# Patient Record
Sex: FEMALE | Race: WHITE | NOT HISPANIC OR LATINO | Employment: FULL TIME | URBAN - METROPOLITAN AREA
[De-identification: names, ages, dates, MRNs, and addresses within clinical notes are randomized per-mention and may not be internally consistent; named-entity substitution may affect disease eponyms.]

---

## 2019-01-28 ENCOUNTER — OFFICE VISIT CONVERTED (OUTPATIENT)
Dept: SURGERY | Facility: CLINIC | Age: 49
End: 2019-01-28
Attending: SURGERY

## 2019-02-04 ENCOUNTER — HOSPITAL ENCOUNTER (OUTPATIENT)
Dept: SLEEP MEDICINE | Facility: HOSPITAL | Age: 49
Discharge: HOME | End: 2019-02-04
Attending: INTERNAL MEDICINE

## 2019-02-11 ENCOUNTER — HOSPITAL ENCOUNTER (OUTPATIENT)
Dept: GASTROENTEROLOGY | Facility: HOSPITAL | Age: 49
Setting detail: HOSPITAL OUTPATIENT SURGERY
Discharge: HOME | End: 2019-02-11
Attending: SURGERY

## 2019-02-13 ENCOUNTER — HOSPITAL ENCOUNTER (OUTPATIENT)
Dept: SLEEP MEDICINE | Facility: HOSPITAL | Age: 49
Discharge: HOME | End: 2019-02-13
Attending: INTERNAL MEDICINE

## 2019-02-19 ENCOUNTER — HOSPITAL ENCOUNTER (OUTPATIENT)
Dept: GENERAL RADIOLOGY | Facility: HOSPITAL | Age: 49
Discharge: HOME | End: 2019-02-19

## 2019-02-25 ENCOUNTER — OFFICE VISIT CONVERTED (OUTPATIENT)
Dept: SURGERY | Facility: CLINIC | Age: 49
End: 2019-02-25
Attending: SURGERY

## 2019-02-25 ENCOUNTER — CONVERSION ENCOUNTER (OUTPATIENT)
Dept: SURGERY | Facility: CLINIC | Age: 49
End: 2019-02-25

## 2019-03-15 ENCOUNTER — HOSPITAL ENCOUNTER (OUTPATIENT)
Dept: SLEEP MEDICINE | Facility: HOSPITAL | Age: 49
Discharge: HOME | End: 2019-03-15
Attending: INTERNAL MEDICINE

## 2019-04-29 ENCOUNTER — HOSPITAL ENCOUNTER (OUTPATIENT)
Dept: SLEEP MEDICINE | Facility: HOSPITAL | Age: 49
Discharge: HOME | End: 2019-04-29
Attending: INTERNAL MEDICINE

## 2019-07-29 ENCOUNTER — HOSPITAL ENCOUNTER (OUTPATIENT)
Dept: SLEEP MEDICINE | Facility: HOSPITAL | Age: 49
Discharge: HOME | End: 2019-07-29
Attending: INTERNAL MEDICINE

## 2019-07-29 ENCOUNTER — OUTSIDE FACILITY SERVICE (OUTPATIENT)
Dept: SLEEP MEDICINE | Facility: HOSPITAL | Age: 49
End: 2019-07-29

## 2019-07-29 PROCEDURE — 99204 OFFICE O/P NEW MOD 45 MIN: CPT | Performed by: INTERNAL MEDICINE

## 2019-12-06 ENCOUNTER — OFFICE VISIT CONVERTED (OUTPATIENT)
Dept: ORTHOPEDIC SURGERY | Facility: CLINIC | Age: 49
End: 2019-12-06
Attending: ORTHOPAEDIC SURGERY

## 2020-01-10 ENCOUNTER — OFFICE VISIT CONVERTED (OUTPATIENT)
Dept: FAMILY MEDICINE CLINIC | Facility: CLINIC | Age: 50
End: 2020-01-10
Attending: NURSE PRACTITIONER

## 2020-01-16 ENCOUNTER — HOSPITAL ENCOUNTER (OUTPATIENT)
Dept: GENERAL RADIOLOGY | Facility: HOSPITAL | Age: 50
Discharge: HOME | End: 2020-01-16
Attending: NURSE PRACTITIONER

## 2020-01-24 ENCOUNTER — HOSPITAL ENCOUNTER (OUTPATIENT)
Dept: PERIOP | Facility: HOSPITAL | Age: 50
Setting detail: HOSPITAL OUTPATIENT SURGERY
Discharge: HOME | End: 2020-01-24
Attending: UROLOGY

## 2020-01-24 ENCOUNTER — OFFICE VISIT CONVERTED (OUTPATIENT)
Dept: UROLOGY | Facility: CLINIC | Age: 50
End: 2020-01-24
Attending: UROLOGY

## 2020-02-10 LAB — CONV PHOTO (CALCULI): NORMAL

## 2020-02-11 LAB
COLOR STONE: NORMAL
COMPN STONE: NORMAL
CONV CA OXALATE MONOHYDRATE: 100 %
CONV CALCULI COMMENT: NORMAL
CONV CALCULI DISCLAIMER: NORMAL
CONV CALCULI NOTE: NORMAL
SIZE STONE: NORMAL MM
WT STONE: 39 MG

## 2020-02-26 ENCOUNTER — HOSPITAL ENCOUNTER (OUTPATIENT)
Dept: GENERAL RADIOLOGY | Facility: HOSPITAL | Age: 50
Discharge: HOME | End: 2020-02-26
Attending: UROLOGY

## 2020-03-07 ENCOUNTER — HOSPITAL ENCOUNTER (OUTPATIENT)
Dept: OTHER | Facility: HOSPITAL | Age: 50
Discharge: HOME | End: 2020-03-07
Attending: NURSE PRACTITIONER

## 2020-03-07 LAB
ALBUMIN SERPL-MCNC: 4.2 G/DL (ref 3.5–5)
ALBUMIN/GLOB SERPL: 1.6 {RATIO} (ref 1.4–2.6)
ALP SERPL-CCNC: 58 U/L (ref 42–98)
ALT SERPL-CCNC: 26 U/L (ref 10–40)
ANION GAP SERPL CALC-SCNC: 16 MMOL/L (ref 8–19)
AST SERPL-CCNC: 24 U/L (ref 15–50)
BASOPHILS # BLD AUTO: 0.05 10*3/UL (ref 0–0.2)
BASOPHILS NFR BLD AUTO: 0.7 % (ref 0–3)
BILIRUB SERPL-MCNC: 0.52 MG/DL (ref 0.2–1.3)
BUN SERPL-MCNC: 12 MG/DL (ref 5–25)
BUN/CREAT SERPL: 17 {RATIO} (ref 6–20)
CALCIUM SERPL-MCNC: 9.1 MG/DL (ref 8.7–10.4)
CHLORIDE SERPL-SCNC: 104 MMOL/L (ref 99–111)
CHOLEST SERPL-MCNC: 250 MG/DL (ref 107–200)
CHOLEST/HDLC SERPL: 6.9 {RATIO} (ref 3–6)
CONV ABS IMM GRAN: 0.05 10*3/UL (ref 0–0.2)
CONV CO2: 25 MMOL/L (ref 22–32)
CONV IMMATURE GRAN: 0.7 % (ref 0–1.8)
CONV TOTAL PROTEIN: 6.9 G/DL (ref 6.3–8.2)
CREAT UR-MCNC: 0.7 MG/DL (ref 0.5–0.9)
DEPRECATED RDW RBC AUTO: 41.1 FL (ref 36.4–46.3)
EOSINOPHIL # BLD AUTO: 0.3 10*3/UL (ref 0–0.7)
EOSINOPHIL # BLD AUTO: 3.9 % (ref 0–7)
ERYTHROCYTE [DISTWIDTH] IN BLOOD BY AUTOMATED COUNT: 12.6 % (ref 11.7–14.4)
GFR SERPLBLD BASED ON 1.73 SQ M-ARVRAT: >60 ML/MIN/{1.73_M2}
GLOBULIN UR ELPH-MCNC: 2.7 G/DL (ref 2–3.5)
GLUCOSE SERPL-MCNC: 101 MG/DL (ref 65–99)
HCT VFR BLD AUTO: 42.4 % (ref 37–47)
HDLC SERPL-MCNC: 36 MG/DL (ref 40–60)
HGB BLD-MCNC: 14.5 G/DL (ref 12–16)
LDLC SERPL CALC-MCNC: 158 MG/DL (ref 70–100)
LYMPHOCYTES # BLD AUTO: 2.01 10*3/UL (ref 1–5)
LYMPHOCYTES NFR BLD AUTO: 26.2 % (ref 20–45)
MCH RBC QN AUTO: 30.7 PG (ref 27–31)
MCHC RBC AUTO-ENTMCNC: 34.2 G/DL (ref 33–37)
MCV RBC AUTO: 89.6 FL (ref 81–99)
MONOCYTES # BLD AUTO: 0.63 10*3/UL (ref 0.2–1.2)
MONOCYTES NFR BLD AUTO: 8.2 % (ref 3–10)
NEUTROPHILS # BLD AUTO: 4.62 10*3/UL (ref 2–8)
NEUTROPHILS NFR BLD AUTO: 60.3 % (ref 30–85)
NRBC CBCN: 0 % (ref 0–0.7)
OSMOLALITY SERPL CALC.SUM OF ELEC: 292 MOSM/KG (ref 273–304)
PLATELET # BLD AUTO: 219 10*3/UL (ref 130–400)
PMV BLD AUTO: 10.8 FL (ref 9.4–12.3)
POTASSIUM SERPL-SCNC: 4.2 MMOL/L (ref 3.5–5.3)
RBC # BLD AUTO: 4.73 10*6/UL (ref 4.2–5.4)
SODIUM SERPL-SCNC: 141 MMOL/L (ref 135–147)
TRIGL SERPL-MCNC: 438 MG/DL (ref 40–150)
TSH SERPL-ACNC: 1.89 M[IU]/L (ref 0.27–4.2)
WBC # BLD AUTO: 7.66 10*3/UL (ref 4.8–10.8)

## 2020-03-10 ENCOUNTER — OFFICE VISIT CONVERTED (OUTPATIENT)
Dept: UROLOGY | Facility: CLINIC | Age: 50
End: 2020-03-10
Attending: UROLOGY

## 2020-03-23 ENCOUNTER — TELEMEDICINE CONVERTED (OUTPATIENT)
Dept: FAMILY MEDICINE CLINIC | Facility: CLINIC | Age: 50
End: 2020-03-23
Attending: NURSE PRACTITIONER

## 2020-06-05 ENCOUNTER — HOSPITAL ENCOUNTER (OUTPATIENT)
Dept: GENERAL RADIOLOGY | Facility: HOSPITAL | Age: 50
Discharge: HOME | End: 2020-06-05
Attending: NURSE PRACTITIONER

## 2020-06-10 ENCOUNTER — HOSPITAL ENCOUNTER (OUTPATIENT)
Dept: OTHER | Facility: HOSPITAL | Age: 50
Discharge: HOME | End: 2020-06-10
Attending: NURSE PRACTITIONER

## 2020-06-10 LAB
ALBUMIN SERPL-MCNC: 4.3 G/DL (ref 3.5–5)
ALBUMIN/GLOB SERPL: 1.6 {RATIO} (ref 1.4–2.6)
ALP SERPL-CCNC: 51 U/L (ref 42–98)
ALT SERPL-CCNC: 26 U/L (ref 10–40)
ANION GAP SERPL CALC-SCNC: 16 MMOL/L (ref 8–19)
AST SERPL-CCNC: 23 U/L (ref 15–50)
BILIRUB SERPL-MCNC: 0.49 MG/DL (ref 0.2–1.3)
BUN SERPL-MCNC: 12 MG/DL (ref 5–25)
BUN/CREAT SERPL: 16 {RATIO} (ref 6–20)
CALCIUM SERPL-MCNC: 9.2 MG/DL (ref 8.7–10.4)
CHLORIDE SERPL-SCNC: 107 MMOL/L (ref 99–111)
CHOLEST SERPL-MCNC: 269 MG/DL (ref 107–200)
CHOLEST/HDLC SERPL: 7.9 {RATIO} (ref 3–6)
CONV CO2: 23 MMOL/L (ref 22–32)
CONV TOTAL PROTEIN: 7 G/DL (ref 6.3–8.2)
CREAT UR-MCNC: 0.77 MG/DL (ref 0.5–0.9)
GFR SERPLBLD BASED ON 1.73 SQ M-ARVRAT: >60 ML/MIN/{1.73_M2}
GLOBULIN UR ELPH-MCNC: 2.7 G/DL (ref 2–3.5)
GLUCOSE SERPL-MCNC: 106 MG/DL (ref 65–99)
HDLC SERPL-MCNC: 34 MG/DL (ref 40–60)
LDLC SERPL CALC-MCNC: 172 MG/DL (ref 70–100)
OSMOLALITY SERPL CALC.SUM OF ELEC: 294 MOSM/KG (ref 273–304)
POTASSIUM SERPL-SCNC: 4 MMOL/L (ref 3.5–5.3)
SODIUM SERPL-SCNC: 142 MMOL/L (ref 135–147)
TRIGL SERPL-MCNC: 409 MG/DL (ref 40–150)

## 2020-06-24 ENCOUNTER — OFFICE VISIT CONVERTED (OUTPATIENT)
Dept: FAMILY MEDICINE CLINIC | Facility: CLINIC | Age: 50
End: 2020-06-24
Attending: NURSE PRACTITIONER

## 2020-06-24 ENCOUNTER — CONVERSION ENCOUNTER (OUTPATIENT)
Dept: FAMILY MEDICINE CLINIC | Facility: CLINIC | Age: 50
End: 2020-06-24

## 2020-07-13 ENCOUNTER — OUTSIDE FACILITY SERVICE (OUTPATIENT)
Dept: SLEEP MEDICINE | Facility: HOSPITAL | Age: 50
End: 2020-07-13

## 2020-07-13 ENCOUNTER — HOSPITAL ENCOUNTER (OUTPATIENT)
Dept: SLEEP MEDICINE | Facility: HOSPITAL | Age: 50
Discharge: HOME | End: 2020-07-13
Attending: INTERNAL MEDICINE

## 2020-07-13 PROCEDURE — 99213 OFFICE O/P EST LOW 20 MIN: CPT | Performed by: INTERNAL MEDICINE

## 2020-09-16 ENCOUNTER — HOSPITAL ENCOUNTER (OUTPATIENT)
Dept: LAB | Facility: HOSPITAL | Age: 50
Discharge: HOME | End: 2020-09-16
Attending: NURSE PRACTITIONER

## 2020-09-16 LAB
ALBUMIN SERPL-MCNC: 4.3 G/DL (ref 3.5–5)
ALBUMIN/GLOB SERPL: 1.8 {RATIO} (ref 1.4–2.6)
ALP SERPL-CCNC: 58 U/L (ref 42–98)
ALT SERPL-CCNC: 18 U/L (ref 10–40)
ANION GAP SERPL CALC-SCNC: 16 MMOL/L (ref 8–19)
AST SERPL-CCNC: 20 U/L (ref 15–50)
BILIRUB SERPL-MCNC: 0.46 MG/DL (ref 0.2–1.3)
BUN SERPL-MCNC: 13 MG/DL (ref 5–25)
BUN/CREAT SERPL: 16 {RATIO} (ref 6–20)
CALCIUM SERPL-MCNC: 9.2 MG/DL (ref 8.7–10.4)
CHLORIDE SERPL-SCNC: 107 MMOL/L (ref 99–111)
CHOLEST SERPL-MCNC: 156 MG/DL (ref 107–200)
CHOLEST/HDLC SERPL: 4.2 {RATIO} (ref 3–6)
CONV CO2: 22 MMOL/L (ref 22–32)
CONV TOTAL PROTEIN: 6.7 G/DL (ref 6.3–8.2)
CREAT UR-MCNC: 0.81 MG/DL (ref 0.5–0.9)
GFR SERPLBLD BASED ON 1.73 SQ M-ARVRAT: >60 ML/MIN/{1.73_M2}
GLOBULIN UR ELPH-MCNC: 2.4 G/DL (ref 2–3.5)
GLUCOSE SERPL-MCNC: 107 MG/DL (ref 65–99)
HDLC SERPL-MCNC: 37 MG/DL (ref 40–60)
LDLC SERPL CALC-MCNC: 77 MG/DL (ref 70–100)
OSMOLALITY SERPL CALC.SUM OF ELEC: 293 MOSM/KG (ref 273–304)
POTASSIUM SERPL-SCNC: 4.1 MMOL/L (ref 3.5–5.3)
SODIUM SERPL-SCNC: 141 MMOL/L (ref 135–147)
TRIGL SERPL-MCNC: 210 MG/DL (ref 40–150)
VLDLC SERPL-MCNC: 42 MG/DL (ref 5–37)

## 2020-09-21 ENCOUNTER — OFFICE VISIT CONVERTED (OUTPATIENT)
Dept: FAMILY MEDICINE CLINIC | Facility: CLINIC | Age: 50
End: 2020-09-21
Attending: NURSE PRACTITIONER

## 2020-09-21 ENCOUNTER — CONVERSION ENCOUNTER (OUTPATIENT)
Dept: FAMILY MEDICINE CLINIC | Facility: CLINIC | Age: 50
End: 2020-09-21

## 2021-04-01 ENCOUNTER — OFFICE VISIT CONVERTED (OUTPATIENT)
Dept: FAMILY MEDICINE CLINIC | Facility: CLINIC | Age: 51
End: 2021-04-01
Attending: NURSE PRACTITIONER

## 2021-05-13 NOTE — PROGRESS NOTES
Progress Note      Patient Name: Loreto Schumacher   Patient ID: [id number]   Sex: Female   YOB: 1970    Primary Care Provider: Consuelo CARLSON   Referring Provider: Consuelo CARLSON    Visit Date: September 21, 2020    Provider: ROBYN Calvillo   Location: [facility]   Location Address: [address] [place] [address], [address]  [place], [place]  [id number]   Location Phone: [phone]          Chief Complaint  · Discuss HLD labwork      History Of Present Illness  Loreto Schumacher is a 50 year old /White female who presents for evaluation and treatment of:      Patient is here today to go over her most recent labwork.    HLD: Vascepa Crestor she is doing good on the medications and tolerating well. She is trying hard to work on her diet. She has given up drinking any type of sodas.     Patient is doing well with the medication. Patient states that she does have some muscle aches first thing in the morning but after that she is fine all day.       Past Medical History  Disease Name Date Onset Notes   Allergic rhinitis, chronic --  --    Bladder Disorder --  --    Hyperlipidemia --  --    Kidney stones --  --    Pap smear for cervical cancer screening 2013 will go to [facility]   Screening Mammogram 02/2019 --    Seasonal allergies --  --          Past Surgical History  Procedure Name Date Notes   Appendectomy 1980 --    Colonoscopy 02/2019 DIVERTICULOSIS, HEMORRHOIDS   Cyst Removal 2012 --    Hysterectomy --  --    Hysterectomy-Abdominal 2004 --          Medication List  Name Date Started Instructions   Crestor 10 mg oral tablet 06/16/2020 take 1 tablet (10 mg) by oral route once daily at bedtime   One-A-Day Womens Formula 18 mg iron-400 mcg-500 mg oral tablet  take 1 tablet by oral route daily   red yeast rice 600 mg oral tablet  take 1 tablet by oral route daily for 90 days   Vascepa 1 gram oral capsule 07/17/2020 take 2 capsules (2 gram) by oral route 2 times per day with food  swallowing whole. Do not chew, open, dissolve and/or crush. for 90 days   Vitamin D3 50 mcg (2,000 unit) oral capsule  take 1 capsule by oral route daily   vitamin E 100 unit oral capsule  take 1 capsule by oral route daily         Allergy List  Allergen Name Date Reaction Notes   NO KNOWN DRUG ALLERGIES --  --  --        Allergies Reconciled  Family Medical History  Disease Name Relative/Age Notes   Stroke Grandmother (maternal)/  Grandmother (paternal)/   --    Heart Disease Father/  Grandfather (maternal)/  Mother/   Mother   Hypertension Father/  Grandfather (maternal)/  Grandmother (maternal)/   --    Cancer, Unspecified Father/   Father   Diabetes, unspecified type Father/   Father   Renal Calculus Father/  Mother/   Mother; Father  Mother; Father; Brother   Family history of colon cancer Father/50s   Father/50s  Father/60s   Family history of breast cancer  Aunt/40s  Aunt/50s   Renal Cancer Brother/30s   Brother/30s         Social History  Finding Status Start/Stop Quantity Notes   Alcohol Never --/-- --  01/10/2020 -    Caffeine Never --/-- --  drinks no  drinks regularly; tea and soft drinks; 1-2 times per day   lives with spouse --  --/-- --  --    . --  --/-- --  --    Recreational Drug Use Never --/-- --  no   Second hand smoke exposure Never --/-- --  no  yes   Tobacco Never --/-- --  former smoker  never a smoker   Working --  --/-- --  --          Immunizations  NameDate Admin Mfg Trade Name Lot Number Route Inj VIS Given VIS Publication   InfluenzaRefused 01/10/2020 NE Not Entered  NE NE     Comments:          Review of Systems  · Constitutional  o Denies  o : fatigue  · Eyes  o Denies  o : blurred vision, changes in vision  · HENT  o Denies  o : headaches  · Cardiovascular  o Denies  o : chest pain, irregular heart beats, rapid heart rate, dyspnea on exertion  · Respiratory  o Denies  o : shortness of breath, wheezing, cough  · Gastrointestinal  o Denies  o : nausea, vomiting, diarrhea,  "constipation, abdominal pain, blood in stools, melena  · Genitourinary  o Denies  o : frequency, dysuria, hematuria  · Integument  o Denies  o : rash, new skin lesions  · Musculoskeletal  o Denies  o : joint pain, joint swelling, muscle pain  · Endocrine  o Denies  o : polyuria, polydipsia      Vitals  Date Time BP Position Site L\R Cuff Size HR RR TEMP (F) WT  HT  BMI kg/m2 BSA m2 O2 Sat HC       09/21/2020 08:35 /83 Sitting    77 - R  97.6 203lbs 6oz 5'  5\" 33.84 2.06 99 %          Physical Examination  · Constitutional  o Appearance  o : well developed, well-nourished, no acute distress  · Head and Face  o Head  o : normocephalic, atraumatic  · Neck  o Inspection/Palpation  o : normal appearance, no masses or tenderness, trachea midline  o Thyroid  o : gland size normal, nontender, no nodules or masses present on palpation  · Respiratory  o Respiratory Effort  o : breathing unlabored  o Inspection of Chest  o : chest rise symmetric bilaterally  o Auscultation of Lungs  o : clear to auscultation bilaterally throughout inspiration and expiration  · Cardiovascular  o Heart  o :   § Auscultation of Heart  § : regular rate and rhythm, no murmurs, gallops or rubs  o Peripheral Vascular System  o :   § Extremities  § : no edema  · Lymphatic  o Neck  o : no cervical lymphadenopathy, no supraclavicular lymphadenopathy  · Psychiatric  o Mood and Affect  o : mood normal, affect appropriate          Assessment  · Hyperlipidemia     272.4/E78.5  cont medications and diet control    Problems Reconciled  Plan  · Orders  o Physical, Primary Care Panel (CBC, CMP, Lipid, TSH) [facility] ([id number], 90922, [address], 83129) - 272.4/E78.5 - 03/07/2021  o ACO-39: Current medications updated and reviewed () - - 09/21/2020  · Medications  o Medications have been Reconciled  o Transition of Care or Provider Policy  · Instructions  o Patient was educated and given low cholesterol diet information.  o Recommended exercise program to assist " with cholesterol, weight loss and overall health improvement.  o Advised that cheeses and other sources of dairy fats, animal fats, fast food, and the extras (candy, pastries, pies, doughnuts and cookies) all contain LDL raising nutrients. Advised to increase fruits, vegetables, whole grains, and to monitor portion sizes.   o Patient is taking medications as prescribed and doing well.   o Patient was educated/instructed on their diagnosis, treatment and medications prior to discharge from the clinic today.  o Call the office with any concerns or questions.  o Electronically Identified Patient Education Materials Provided Electronically  · Disposition  o Follow Up in 6 months     no refills needed at OV today. will call when needs refills. will f/u in 6 months, lab form given for pt to have labs drawn prior to appt.             Electronically Signed by: ROBYN Calvillo -Author on September 21, 2020 09:31:24 AM

## 2021-05-13 NOTE — PROGRESS NOTES
Progress Note      Patient Name: Loreto Schumacher   Patient ID: [id number]   Sex: Female   YOB: 1970    Primary Care Provider: Consuelo CARLSON   Referring Provider: Consuelo CARLSON    Visit Date: June 24, 2020    Provider: ROBYN Calvillo   Location: [facility]   Location Address: [address] [place] [address], [address]  [place], [place]  [id number]   Location Phone: [phone]          Chief Complaint  · Pt here to f/u on lab work       History Of Present Illness  Loreto Schumacher is a 49 year old /White female who presents for evaluation and treatment of:      Hyeprlipidemia    PT here to f/u on labwork from previous OV. She was previously started on Vascepa in 3/20 due to elevated triglycerides and has been taking compliantly, however at the recent labwork on 6/10, there wasn't alot of improvement. As a result she was started on Crestor 10mg and she started that three nights ago. She states she is trying harder on her diet, but she has a strong family history of elevated triglycerides.       Past Medical History  Disease Name Date Onset Notes   Allergic rhinitis, chronic --  --    Bladder Disorder --  --    Hyperlipidemia --  --    Kidney stones --  --    Pap smear for cervical cancer screening 2013 will go to [facility]   Screening Mammogram 02/2019 --    Seasonal allergies --  --          Past Surgical History  Procedure Name Date Notes   Appendectomy 1980 --    Colonoscopy 02/2019 DIVERTICULOSIS, HEMORRHOIDS   Cyst Removal 2012 --    Hysterectomy --  --    Hysterectomy-Abdominal 2004 --          Medication List  Name Date Started Instructions   Crestor 10 mg oral tablet 06/16/2020 take 1 tablet (10 mg) by oral route once daily at bedtime   One-A-Day Womens Formula 18 mg iron-400 mcg-500 mg oral tablet  take 1 tablet by oral route daily   Vascepa 1 gram oral capsule 03/20/2020 take 2 capsules (2 gram) by oral route 2 times per day with food swallowing whole. Do not chew, open, dissolve and/or  crush.   Vitamin D3 50 mcg (2,000 unit) oral capsule  take 1 capsule by oral route daily         Allergy List  Allergen Name Date Reaction Notes   NO KNOWN DRUG ALLERGIES --  --  --        Allergies Reconciled  Family Medical History  Disease Name Relative/Age Notes   Stroke Grandmother (maternal)/  Grandmother (paternal)/   --    Heart Disease Father/  Grandfather (maternal)/  Mother/   Mother   Hypertension Father/  Grandfather (maternal)/  Grandmother (maternal)/   --    Cancer, Unspecified Father/   Father   Diabetes, unspecified type Father/   Father   Renal Calculus Father/  Mother/   Mother; Father  Mother; Father; Brother   Family history of colon cancer Father/50s   Father/50s  Father/60s   Family history of breast cancer  Aunt/40s  Aunt/50s   Renal Cancer Brother/30s   Brother/30s         Social History  Finding Status Start/Stop Quantity Notes   Alcohol Never --/-- --  01/10/2020 -    Caffeine Never --/-- --  drinks no  drinks regularly; tea and soft drinks; 1-2 times per day   lives with spouse --  --/-- --  --    . --  --/-- --  --    Recreational Drug Use Never --/-- --  no   Second hand smoke exposure Never --/-- --  no  yes   Tobacco Never --/-- --  former smoker  never a smoker   Working --  --/-- --  --          Immunizations  NameDate Admin Mfg Trade Name Lot Number Route Inj VIS Given VIS Publication   InfluenzaRefused 01/10/2020 NE Not Entered  NE NE     Comments:          Review of Systems  · Constitutional  o Denies  o : fever, fatigue, weight loss, weight gain  · Cardiovascular  o Denies  o : lower extremity edema, claudication, chest pressure, palpitations  · Respiratory  o Denies  o : shortness of breath, wheezing, cough, hemoptysis, dyspnea on exertion  · Gastrointestinal  o Denies  o : nausea, vomiting, diarrhea, constipation, abdominal pain      Vitals  Date Time BP Position Site L\R Cuff Size HR RR TEMP (F) WT  HT  BMI kg/m2 BSA m2 O2 Sat HC       06/24/2020 08:08 /75  "Sitting    67 - R  98.3 200lbs 6oz 5'  4\" 34.39 2.03 99 %          Physical Examination  · Constitutional  o Appearance  o : well developed, well-nourished, no acute distress  · Head and Face  o Head  o : normocephalic, atraumatic  · Neck  o Inspection/Palpation  o : normal appearance, no masses or tenderness, trachea midline  o Thyroid  o : gland size normal, nontender, no nodules or masses present on palpation  · Respiratory  o Respiratory Effort  o : breathing unlabored  o Inspection of Chest  o : chest rise symmetric bilaterally  o Auscultation of Lungs  o : clear to auscultation bilaterally throughout inspiration and expiration  · Cardiovascular  o Heart  o :   § Auscultation of Heart  § : regular rate and rhythm, no murmurs, gallops or rubs  o Peripheral Vascular System  o :   § Extremities  § : no edema  · Psychiatric  o Mood and Affect  o : mood normal, affect appropriate          Assessment  · Hyperlipidemia     272.4/E78.5  cont. Vascepa and Crestor. Side effects and administration addressed. will recheck 3 months    Problems Reconciled  Plan  · Orders  o ACO-39: Current medications updated and reviewed () - - 06/24/2020  · Medications  o Medications have been Reconciled  o Transition of Care or Provider Policy  · Instructions  o Patient was educated and given low cholesterol diet information.  o Recommended exercise program to assist with cholesterol, weight loss and overall health improvement.  o Advised that cheeses and other sources of dairy fats, animal fats, fast food, and the extras (candy, pastries, pies, doughnuts and cookies) all contain LDL raising nutrients. Advised to increase fruits, vegetables, whole grains, and to monitor portion sizes.   o Patient is taking medications as prescribed and doing well.   o Take all medications as prescribed/directed.  o Patient was educated/instructed on their diagnosis, treatment and medications prior to discharge from the clinic today.  o Patient was " instructed to exercise regularly.  o Call the office with any concerns or questions.  · Disposition  o Follow Up in 3 months            Electronically Signed by: ROBYN Calvillo -Author on June 24, 2020 10:05:45 AM

## 2021-05-14 VITALS
TEMPERATURE: 98.3 F | WEIGHT: 208.06 LBS | BODY MASS INDEX: 34.66 KG/M2 | DIASTOLIC BLOOD PRESSURE: 80 MMHG | OXYGEN SATURATION: 99 % | HEART RATE: 70 BPM | SYSTOLIC BLOOD PRESSURE: 129 MMHG | HEIGHT: 65 IN

## 2021-05-14 VITALS
TEMPERATURE: 97.6 F | HEART RATE: 77 BPM | SYSTOLIC BLOOD PRESSURE: 132 MMHG | DIASTOLIC BLOOD PRESSURE: 83 MMHG | BODY MASS INDEX: 33.88 KG/M2 | WEIGHT: 203.37 LBS | HEIGHT: 65 IN | OXYGEN SATURATION: 99 %

## 2021-05-14 NOTE — PROGRESS NOTES
Progress Note      Patient Name: Loreto Schumacher   Patient ID: [id number]   Sex: Female   YOB: 1970    Primary Care Provider: Consuelo CARLSON   Referring Provider: Consuelo CARLSON    Visit Date: April 1, 2021    Provider: ROBYN Peñaloza   Location: [facility]   Location Address: [address] [place] [address], [address]  [place], [place]  [id number]   Location Phone: [phone]          Chief Complaint  · Right ear pain  · Dizziness  · Nausea      History Of Present Illness  Loreto Schumacher is a 50 year old /White female who presents for evaluation and treatment of:      Patient complaining of right ear pain since last night.  Patient states she woke up in the middle of the night with intense pain.  Patient states everytime she swallowed her pain increased.  Symptoms have improved somewhat since waking up this morning.  Patient denies ear drainage, d/c, sinus congestion, fever, decreased hearing.  Patient admits to aching through her shoulders and up into her right ear.    Patient complaining of dizzy spells, causing nausea X 3-4 weeks.  Patient has hx of vertigo.  Patient has used Scopolamine patches in the past with good results. needs new RX       Past Medical History  Disease Name Date Onset Notes   Allergic rhinitis, chronic --  --    Bladder disorder --  --    Hyperlipidemia --  --    Kidney Stones --  --    Pap smear for cervical cancer screening 2013 will go to [facility]   Screening Mammogram 02/2019 --    Seasonal allergies --  --          Past Surgical History  Procedure Name Date Notes   Appendectomy 1980 --    Colonoscopy 02/2019 DIVERTICULOSIS, HEMORRHOIDS   Cyst Removal 2012 --    Hysterectomy --  --    Hysterectomy-Abdominal 2004 --          Medication List  Name Date Started Instructions   acetylcysteine 500 mg oral capsule  take 1 capsule by oral route daily   Crestor 10 mg oral tablet 12/23/2020 take 1 tablet (10 mg) by oral route once daily at bedtime for  30 days   hydroxychloroquine 200 mg oral tablet  take 1 tablet (200 mg) by oral route every Sunday   One-A-Day Womens Formula 18 mg iron-400 mcg-500 mg oral tablet  take 1 tablet by oral route daily   red yeast rice 600 mg oral tablet  take 1 tablet by oral route daily for 90 days   Vascepa 1 gram oral capsule 12/23/2020 take 2 capsules (2 gram) by oral route 2 times per day with food swallowing whole. Do not chew, open, dissolve and/or crush. for 90 days   Vitamin D3 125 mcg (5,000 unit) oral tablet  take 1 tablet by oral route daily   vitamin E 100 unit oral capsule  take 1 capsule by oral route daily   zinc 50 mg oral tablet  take 1 tablet by oral route daily         Allergy List  Allergen Name Date Reaction Notes   NO KNOWN DRUG ALLERGIES --  --  --        Allergies Reconciled  Family Medical History  Disease Name Relative/Age Notes   Stroke Grandmother (maternal)/  Grandmother (paternal)/   --    Heart Disease Father/  Grandfather (maternal)/  Mother/   Mother   Hypertension Father/  Grandfather (maternal)/  Grandmother (maternal)/   --    Cancer, Unspecified Father/   Father   Diabetes, unspecified type Father/   Father   Renal Calculus Father/  Mother/   Mother; Father  Mother; Father; Brother   Family history of colon cancer Father/50s   Father/50s  Father/60s   Family history of breast cancer  Aunt/40s  Aunt/50s   Renal Cancer Brother/30s   Brother/30s         Social History  Finding Status Start/Stop Quantity Notes   Alcohol Never --/-- --  01/10/2020 -    Caffeine Never --/-- --  drinks no  drinks regularly; tea and soft drinks; 1-2 times per day   lives with spouse --  --/-- --  --    . --  --/-- --  --    Recreational Drug Use Never --/-- --  no   Second hand smoke exposure Never --/-- --  no  yes   Tobacco Never --/-- --  --    Working --  --/-- --  --          Immunizations  NameDate Admin Mfg Trade Name Lot Number Route Inj VIS Given VIS Publication   InfluenzaRefused 04/01/2021 NE Not  "Entered  NE NE     Comments:          Review of Systems  · Constitutional  o Denies  o : fever, fatigue, weight loss, weight gain  · Cardiovascular  o Denies  o : lower extremity edema, claudication, chest pressure, palpitations  · Respiratory  o Denies  o : shortness of breath, wheezing, cough, hemoptysis, dyspnea on exertion  · Gastrointestinal  o Denies  o : nausea, vomiting, diarrhea, constipation, abdominal pain      Vitals  Date Time BP Position Site L\R Cuff Size HR RR TEMP (F) WT  HT  BMI kg/m2 BSA m2 O2 Sat FR L/min FiO2 HC       06/24/2020 08:08 /75 Sitting    67 - R  98.3 200lbs 6oz 5'  4\" 34.39 2.03 99 %      09/21/2020 08:35 /83 Sitting    77 - R  97.6 203lbs 6oz 5'  5\" 33.84 2.06 99 %  21%    04/01/2021 09:47 /80 Sitting    70 - R  98.3 208lbs 1oz 5'  5\" 34.62 2.08 99 %  21%          Physical Examination  · Constitutional  o Appearance  o : well developed, well-nourished, no acute distress  · Head and Face  o Head  o : normocephalic, atraumatic  · Ears, Nose, Mouth and Throat  o Ears  o :   § External Ears  § : external auditory canal appearance normal, no discharge present  § Otoscopic Examination  § : tympanic membranes pearly white/gray bilaterally, moderate amt fluid noted right TM, small amt left   o Nose  o :   § External Nose  § : no lesions noted  § Nasopharynx  § : no discharge present  o Oral Cavity  o :   § Oral Mucosa  § : oral mucosa light pink  o Throat  o :   § Oropharynx  § : tonsils without exudate, no palatal petechiae  · Neck  o Inspection/Palpation  o : normal appearance, no masses or tenderness, trachea midline  o Thyroid  o : gland size normal, nontender, no nodules or masses present on palpation  · Respiratory  o Respiratory Effort  o : breathing unlabored  o Inspection of Chest  o : chest rise symmetric bilaterally  o Auscultation of Lungs  o : clear to auscultation bilaterally throughout inspiration and expiration  · Cardiovascular  o Heart  o : "   § Auscultation of Heart  § : regular rate and rhythm, no murmurs, gallops or rubs  o Peripheral Vascular System  o :   § Extremities  § : no edema  · Lymphatic  o Neck  o : no cervical lymphadenopathy, no supraclavicular lymphadenopathy  · Psychiatric  o Mood and Affect  o : mood normal, affect appropriate          Assessment  · Nausea     787.02/R11.0  · Otitis, serous     381.4/H65.90  · Otalgia of right ear     388.70/H92.01  · Vertigo     780.4/R42      Plan  · Orders  o ACO-14: Influenza immunization was not administered for reasons documented [facility] () - - 2021   Patient refused  o ACO-39: Current medications updated and reviewed (, 0609F) - - 2021  · Medications  o scopolamine base 1 mg over 3 days transdermal patch 3 day   SIG: apply 1 patch by transdermal route to the hairless area behind 1 ear at least 4 hr before effect is required; reapply every 3 days as needed   DISP: (1) Box with 0 refills  Prescribed on 2021     o prednisone 20 mg oral tablet   SI tab PO BID for 5 days   DISP: (10) Tablet with 0 refills  Prescribed on 2021     o amoxicillin 875 mg oral tablet   SIG: take 1 tablet (875 mg) by oral route every 12 hours   DISP: (20) Tablet with 0 refills  Prescribed on 2021     o Medications have been Reconciled  o Transition of Care or Provider Policy  · Instructions  o Take all medications as prescribed/directed.  o Patient was educated/instructed on their diagnosis, treatment and medications prior to discharge from the clinic today.  o Patient instructed to seek medical attention urgently for new or worsening symptoms.  o Call the office with any concerns or questions.  o Chronic conditions reviewed and taken into consideration for today's treatment plan.  · Disposition  o Call or Return if symptoms worsen or persist.  o Follow Up PRN     because we will be out of office the next 3 days, pt given rx for antibiotic to place on hold- if pain worsening, fever,  etc, pt is to fill and take.             Electronically Signed by: ROBYN Peñaloza -Author on April 1, 2021 10:37:47 AM

## 2021-05-15 VITALS
DIASTOLIC BLOOD PRESSURE: 75 MMHG | OXYGEN SATURATION: 99 % | SYSTOLIC BLOOD PRESSURE: 131 MMHG | WEIGHT: 200.37 LBS | HEIGHT: 64 IN | TEMPERATURE: 98.3 F | HEART RATE: 67 BPM | BODY MASS INDEX: 34.21 KG/M2

## 2021-05-15 VITALS — BODY MASS INDEX: 35 KG/M2 | RESPIRATION RATE: 18 BRPM | WEIGHT: 205 LBS | HEIGHT: 64 IN

## 2021-05-15 VITALS
DIASTOLIC BLOOD PRESSURE: 71 MMHG | HEIGHT: 64 IN | SYSTOLIC BLOOD PRESSURE: 128 MMHG | WEIGHT: 207.37 LBS | BODY MASS INDEX: 35.4 KG/M2 | HEART RATE: 77 BPM | OXYGEN SATURATION: 98 %

## 2021-05-15 VITALS — HEIGHT: 65 IN | OXYGEN SATURATION: 97 % | HEART RATE: 97 BPM

## 2021-05-15 VITALS — BODY MASS INDEX: 35.36 KG/M2 | HEIGHT: 64 IN | WEIGHT: 207.12 LBS | RESPIRATION RATE: 16 BRPM

## 2021-05-16 VITALS — HEIGHT: 64 IN | BODY MASS INDEX: 33.97 KG/M2 | WEIGHT: 199 LBS | RESPIRATION RATE: 14 BRPM

## 2021-05-16 VITALS — BODY MASS INDEX: 34.15 KG/M2 | WEIGHT: 200 LBS | HEIGHT: 64 IN | RESPIRATION RATE: 14 BRPM

## 2021-11-16 ENCOUNTER — OFFICE VISIT (OUTPATIENT)
Dept: FAMILY MEDICINE CLINIC | Facility: CLINIC | Age: 51
End: 2021-11-16

## 2021-11-16 VITALS
DIASTOLIC BLOOD PRESSURE: 83 MMHG | SYSTOLIC BLOOD PRESSURE: 148 MMHG | HEART RATE: 80 BPM | OXYGEN SATURATION: 98 % | HEIGHT: 65 IN | BODY MASS INDEX: 34.66 KG/M2 | WEIGHT: 208 LBS

## 2021-11-16 DIAGNOSIS — K21.9 GASTROESOPHAGEAL REFLUX DISEASE WITHOUT ESOPHAGITIS: Primary | ICD-10-CM

## 2021-11-16 DIAGNOSIS — K44.9 HIATAL HERNIA: ICD-10-CM

## 2021-11-16 PROBLEM — N32.9 BLADDER DISORDER: Status: ACTIVE | Noted: 2021-11-16

## 2021-11-16 PROBLEM — N20.0 KIDNEY STONES: Status: ACTIVE | Noted: 2021-11-16

## 2021-11-16 PROBLEM — Z12.4 PAP SMEAR FOR CERVICAL CANCER SCREENING: Status: ACTIVE | Noted: 2021-11-16

## 2021-11-16 PROBLEM — E78.5 HYPERLIPIDEMIA: Status: ACTIVE | Noted: 2021-11-16

## 2021-11-16 PROBLEM — J30.2 SEASONAL ALLERGIC RHINITIS: Status: ACTIVE | Noted: 2021-11-16

## 2021-11-16 PROCEDURE — 99213 OFFICE O/P EST LOW 20 MIN: CPT | Performed by: NURSE PRACTITIONER

## 2021-11-16 RX ORDER — OMEPRAZOLE 20 MG/1
20 CAPSULE, DELAYED RELEASE ORAL DAILY
Qty: 30 CAPSULE | Refills: 1 | Status: SHIPPED | OUTPATIENT
Start: 2021-11-16 | End: 2022-11-02

## 2021-11-16 NOTE — PROGRESS NOTES
"Chief Complaint  Reflux issues, hiatal hernia  Subjective          Loreto Schumacher presents to [facility]  History of Present Illness  Patient complaining of reflux flareup due to hiatal hernia.  Onset of symptoms started in 2017, and has had flares off and on since then. States this flare has been going on since Sunday. Pt states cannot tolerate spicy food at all. But even potato soup last night caused sever burning and stinging in chest and throat and reflux. Denies ever getting to the point of vomiting. Denies difficulty swallowing but states does get choked up eating food very frequently, states \"my  makes fun of me for it\" states dx with hiatal hernia years ago, has never had EGD.  Currently taking no over-the-counter medications for this.  Objective   Vital Signs:   /83   Pulse 80   Ht 165.1 cm (65\")   Wt 94.3 kg (208 lb)   SpO2 98%   BMI 34.61 kg/m²     Physical Exam  Vitals reviewed.   Constitutional:       Appearance: Normal appearance. She is well-developed.   HENT:      Head: Normocephalic and atraumatic.      Right Ear: External ear normal.      Left Ear: External ear normal.      Mouth/Throat:      Pharynx: No oropharyngeal exudate.   Eyes:      Conjunctiva/sclera: Conjunctivae normal.      Pupils: Pupils are equal, round, and reactive to light.   Cardiovascular:      Rate and Rhythm: Normal rate and regular rhythm.      Heart sounds: No murmur heard.  No friction rub. No gallop.    Pulmonary:      Effort: Pulmonary effort is normal.      Breath sounds: Normal breath sounds. No wheezing or rhonchi.   Abdominal:      General: Bowel sounds are normal. There is no distension.      Palpations: Abdomen is soft.      Tenderness: There is no abdominal tenderness.   Skin:     General: Skin is warm and dry.   Neurological:      Mental Status: She is alert and oriented to person, place, and time.      Cranial Nerves: No cranial nerve deficit.   Psychiatric:    "      Mood and Affect: Mood and affect normal.         Behavior: Behavior normal.         Thought Content: Thought content normal.         Judgment: Judgment normal.        Result Review :                       Assessment and Plan    Diagnoses and all orders for this visit:    1. Gastroesophageal reflux disease without esophagitis (Primary)  -     omeprazole (priLOSEC) 20 MG capsule; Take 1 capsule by mouth Daily.  Dispense: 30 capsule; Refill: 1  -     Cancel: Ambulatory referral for Screening EGD  -     Ambulatory referral for Screening EGD    2. Hiatal hernia  -     omeprazole (priLOSEC) 20 MG capsule; Take 1 capsule by mouth Daily.  Dispense: 30 capsule; Refill: 1  -     Cancel: Ambulatory referral for Screening EGD  -     Ambulatory referral for Screening EGD        Follow Up   Return if symptoms worsen or fail to improve.  Patient was given instructions and counseling regarding her condition or for health maintenance advice. Please see specific information pulled into the AVS if appropriate.

## 2021-11-23 ENCOUNTER — TELEPHONE (OUTPATIENT)
Dept: FAMILY MEDICINE CLINIC | Facility: CLINIC | Age: 51
End: 2021-11-23

## 2021-11-23 NOTE — TELEPHONE ENCOUNTER
Patient aware that the [facility]UB is suppose to schedule and told her she is free to call and schedule if she wants it sooner

## 2021-11-23 NOTE — TELEPHONE ENCOUNTER
Caller: Loreto Schumacher    Relationship: Self    Best call back number: [phone]     What was the call regarding: PATIENT WOULD LIKE A CALL BACK TO GET AN UPDATE ON THE UPPER GI THAT WAS SUPPOSED TO BE SCHEDULED FOR HER.    Do you require a callback: YES

## 2021-11-27 PROCEDURE — 87635 SARS-COV-2 COVID-19 AMP PRB: CPT | Performed by: NURSE PRACTITIONER

## 2021-12-07 ENCOUNTER — OFFICE VISIT (OUTPATIENT)
Dept: SURGERY | Facility: CLINIC | Age: 51
End: 2021-12-07

## 2021-12-07 ENCOUNTER — PREP FOR SURGERY (OUTPATIENT)
Dept: OTHER | Facility: HOSPITAL | Age: 51
End: 2021-12-07

## 2021-12-07 VITALS — BODY MASS INDEX: 34.79 KG/M2 | WEIGHT: 208.8 LBS | RESPIRATION RATE: 16 BRPM | HEIGHT: 65 IN

## 2021-12-07 DIAGNOSIS — K44.9 HIATAL HERNIA: Primary | ICD-10-CM

## 2021-12-07 DIAGNOSIS — R07.2 SUBSTERNAL PAIN: Primary | ICD-10-CM

## 2021-12-07 PROCEDURE — 99214 OFFICE O/P EST MOD 30 MIN: CPT | Performed by: SURGERY

## 2021-12-07 RX ORDER — SODIUM CHLORIDE 0.9 % (FLUSH) 0.9 %
10 SYRINGE (ML) INJECTION AS NEEDED
Status: CANCELLED | OUTPATIENT
Start: 2021-12-07

## 2021-12-07 RX ORDER — SODIUM CHLORIDE 0.9 % (FLUSH) 0.9 %
3 SYRINGE (ML) INJECTION EVERY 12 HOURS SCHEDULED
Status: CANCELLED | OUTPATIENT
Start: 2021-12-07

## 2021-12-07 NOTE — PROGRESS NOTES
"General Surgery/Colorectal Surgery Note    Patient Name:  Loreto Schumacher  YOB: 1970  [id number]    Referring Provider: Ana Arrieta AP*      Patient Care Team:  Consuelo Arteaga APRN as PCP - General (Family Medicine)    Chief complaint hiatal hernia    Subjective .     History of present illness:    She has a history of significant heavy lifting since 2012 with her job.  In 2017 or 18 she had a episode of substernal chest pain which awakened her at night.  She said she had a negative heart work-up at that time.  She said that she was told it was likely due to hiatal hernia.  She denies any reflux symptoms.  No acid taste in her mouth.  She does not have to sleep sitting up.  She recently had a flareup about 3 weeks ago awakening with substernal chest pain.  She was started on Prilosec recently.  She denies any chest pain with exertion.  No tobacco use.  No blood thinner use.  No history of ulcer disease.  No NSAID abuse.  No previous upper endoscopy.  No weight loss.  No family history of esophageal or gastric cancer.  Her father had colorectal cancer.  Colonoscopy up-to-date by Dr. Grijalva in 2019.  CT abdomen and pelvis with \"tiny\" hiatal hernia January 2020.    History:  Past Medical History:   Diagnosis Date   • Hyperlipidemia        Past Surgical History:   Procedure Laterality Date   • APPENDECTOMY     • CYST REMOVAL      THIGH, ELBOW, HEAD   • HYSTERECTOMY     • KIDNEY STONE SURGERY         No family history on file.    Social History     Tobacco Use   • Smoking status: Never Smoker   • Smokeless tobacco: Never Used   Vaping Use   • Vaping Use: Never used   Substance Use Topics   • Alcohol use: Not Currently   • Drug use: Not on file       Review of Systems  All systems were reviewed and negative except for:   Review of Systems   Constitutional: Negative for chills, fever and unexpected weight loss.   HENT: Negative for congestion, nosebleeds and voice change.    Eyes: Negative for " blurred vision, double vision and discharge.   Respiratory: Negative for apnea, chest tightness and shortness of breath.    Cardiovascular: Negative for chest pain and leg swelling.   Gastrointestinal:        See HPI   Endocrine: Negative for cold intolerance and heat intolerance.   Genitourinary: Negative for dysuria, hematuria and urgency.   Musculoskeletal: Negative for back pain, joint swelling and neck pain.   Skin: Negative for color change and dry skin.   Neurological: Negative for dizziness and confusion.   Hematological: Negative for adenopathy.   Psychiatric/Behavioral: Negative for agitation and behavioral problems.     MEDS:  Prior to Admission medications    Medication Sig Start Date End Date Taking? Authorizing Provider   Acetylcysteine (NAC) 500 MG capsule Take  by mouth.    Emergency, Nurse SHAHRZAD Mejia   azithromycin (Zithromax) 250 MG tablet Take 2 tabs po day 1, then 1 tab po daily x 4 days. 8/23/21   Radha Pena MD   brompheniramine-pseudoephedrine-DM 30-2-10 MG/5ML syrup Take 10 mL by mouth 4 (Four) Times a Day As Needed for Cough or Allergies. 8/23/21   Radha Pena MD   Cholecalciferol 125 MCG (5000 UT) tablet Vitamin D3 125 mcg (5,000 unit) oral tablet take 1 tablet by oral route daily   Active    Emergency, Nurse Roberto RN   Cholecalciferol 50 MCG (2000 UT) capsule Vitamin D3 50 mcg (2,000 unit) oral capsule take 1 capsule by oral route daily   Suspended    Emergency, Nurse SHAHRZAD Mejia   hydroxychloroquine (PLAQUENIL) 200 MG tablet hydroxychloroquine 200 mg oral tablet take 1 tablet (200 mg) by oral route every Sunday   Active    Emergency, Nurse Epic, RN   Multiple Vitamins-Calcium (ONE-A-DAY WOMENS FORMULA PO) One-A-Day Womens Formula 18 mg iron-400 mcg-500 mg oral tablet take 1 tablet by oral route daily   Active    Emergency, Nurse Roberto RN   omeprazole (priLOSEC) 20 MG capsule Take 1 capsule by mouth Daily. 11/16/21   Ana Arrieta, APRN   Red Yeast Rice Extract 600 MG  "tablet red yeast rice 600 mg oral tablet take 1 tablet by oral route daily for 90 days   Active    Emergency, Nurse Roberto RN   vitamin C (ASCORBIC ACID) 250 MG tablet Take 250 mg by mouth Daily.    Emergency, Nurse Epic, RN   vitamin E 100 UNIT capsule vitamin E 100 unit oral capsule take 1 capsule by oral route daily   Active    Emergency, Nurse Epic, RN   Zinc 50 MG capsule zinc 50 mg oral tablet take 1 tablet by oral route daily   Active    Emergency, Nurse Epic, RN        Allergies:  Patient has no known allergies.    Objective     Vital Signs        Physical Exam:     General Appearance:    Alert, cooperative, in no acute distress   Head:    Normocephalic, without obvious abnormality, atraumatic   Eyes:          Conjunctivae and sclerae normal, no icterus,     Ears:    Ears appear intact with no abnormalities noted   Throat:   No oral lesions, no thrush, oral mucosa moist   Neck:   No adenopathy, supple, trachea midline, no thyromegaly   Back:     No kyphosis present, no scoliosis present, no skin lesions,      erythema or scars, no tenderness to percussion or                   palpation,   range of motion normal   Lungs:     Clear to auscultation,respirations regular, even and                  unlabored    Heart:    Regular rhythm and normal rate, normal S1 and S2, no            murmur, no gallop, no rub, no click   Chest Wall:    No abnormalities observed   Abdomen:     Normal bowel sounds, no masses, no organomegaly, soft        non-tender, non-distended, no guarding, no rebound                tenderness   Rectal:        Extremities:   Moves all extremities well, no edema, no cyanosis, no             redness   Pulses:   Pulses palpable and equal bilaterally   Skin:   No bleeding, bruising or rash   Lymph nodes:   No palpable adenopathy   Neurologic:   A/o x 4 with no deficits       Results Review:   {Results Review:04408::\"I reviewed the patient's new clinical results.\"    LABS/IMAGING:  Results for orders " placed or performed during the hospital encounter of 11/27/21   COVID-19,CEPHEID/CARLOS/BDMAX,COR/MATI/PAD/KIEL IN-HOUSE(OR EMERGENT/ADD-ON),NP SWAB IN TRANSPORT MEDIA 3-4 HR TAT, RT-PCR - Swab, Nasopharynx    Specimen: Nasopharynx; Swab   Result Value Ref Range    COVID19 Not Detected Not Detected - Ref. Range   POCT Influenza A/B    Specimen: Swab   Result Value Ref Range    Rapid Influenza A Ag Negative Negative    Rapid Influenza B Ag Negative Negative    Internal Control Passed Passed    Lot Number [id number]     Expiration Date 5,122         Result Review :     Assessment/Plan     Substernal chest pain, history of hiatal hernia, questionable esophageal spasm, no chest pain with exertion.    I had a discussion with the patient.  I recommended referral to cardiology for further evaluation.  Also recommended EGD for evaluation.  Benefits and alternatives discussed.  Risk procedure including bleeding and perforation discussed.  All questions answered.  She agrees with the plan.  Orders placed.  Continue PPI.  Next colonoscopy recommended in 2024.  Thank you for the consult.           This document has been electronically signed by Lázaro Ortega MD  December 7, 2021 09:47 EST

## 2021-12-07 NOTE — H&P (VIEW-ONLY)
"General Surgery/Colorectal Surgery Note    Patient Name:  Loreto Schumacher  YOB: 1970  [id number]    Referring Provider: Ana Arrieta AP*      Patient Care Team:  Consuelo Arteaga APRN as PCP - General (Family Medicine)    Chief complaint hiatal hernia    Subjective .     History of present illness:    She has a history of significant heavy lifting since 2012 with her job.  In 2017 or 18 she had a episode of substernal chest pain which awakened her at night.  She said she had a negative heart work-up at that time.  She said that she was told it was likely due to hiatal hernia.  She denies any reflux symptoms.  No acid taste in her mouth.  She does not have to sleep sitting up.  She recently had a flareup about 3 weeks ago awakening with substernal chest pain.  She was started on Prilosec recently.  She denies any chest pain with exertion.  No tobacco use.  No blood thinner use.  No history of ulcer disease.  No NSAID abuse.  No previous upper endoscopy.  No weight loss.  No family history of esophageal or gastric cancer.  Her father had colorectal cancer.  Colonoscopy up-to-date by Dr. Grijalva in 2019.  CT abdomen and pelvis with \"tiny\" hiatal hernia January 2020.    History:  Past Medical History:   Diagnosis Date   • Hyperlipidemia        Past Surgical History:   Procedure Laterality Date   • APPENDECTOMY     • CYST REMOVAL      THIGH, ELBOW, HEAD   • HYSTERECTOMY     • KIDNEY STONE SURGERY         No family history on file.    Social History     Tobacco Use   • Smoking status: Never Smoker   • Smokeless tobacco: Never Used   Vaping Use   • Vaping Use: Never used   Substance Use Topics   • Alcohol use: Not Currently   • Drug use: Not on file       Review of Systems  All systems were reviewed and negative except for:   Review of Systems   Constitutional: Negative for chills, fever and unexpected weight loss.   HENT: Negative for congestion, nosebleeds and voice change.    Eyes: Negative for " blurred vision, double vision and discharge.   Respiratory: Negative for apnea, chest tightness and shortness of breath.    Cardiovascular: Negative for chest pain and leg swelling.   Gastrointestinal:        See HPI   Endocrine: Negative for cold intolerance and heat intolerance.   Genitourinary: Negative for dysuria, hematuria and urgency.   Musculoskeletal: Negative for back pain, joint swelling and neck pain.   Skin: Negative for color change and dry skin.   Neurological: Negative for dizziness and confusion.   Hematological: Negative for adenopathy.   Psychiatric/Behavioral: Negative for agitation and behavioral problems.     MEDS:  Prior to Admission medications    Medication Sig Start Date End Date Taking? Authorizing Provider   Acetylcysteine (NAC) 500 MG capsule Take  by mouth.    Emergency, Nurse SHAHRZAD Mejia   azithromycin (Zithromax) 250 MG tablet Take 2 tabs po day 1, then 1 tab po daily x 4 days. 8/23/21   Radha Pena MD   brompheniramine-pseudoephedrine-DM 30-2-10 MG/5ML syrup Take 10 mL by mouth 4 (Four) Times a Day As Needed for Cough or Allergies. 8/23/21   Radha Pena MD   Cholecalciferol 125 MCG (5000 UT) tablet Vitamin D3 125 mcg (5,000 unit) oral tablet take 1 tablet by oral route daily   Active    Emergency, Nurse Roberto RN   Cholecalciferol 50 MCG (2000 UT) capsule Vitamin D3 50 mcg (2,000 unit) oral capsule take 1 capsule by oral route daily   Suspended    Emergency, Nurse SHAHRZAD Mejia   hydroxychloroquine (PLAQUENIL) 200 MG tablet hydroxychloroquine 200 mg oral tablet take 1 tablet (200 mg) by oral route every Sunday   Active    Emergency, Nurse Epic, RN   Multiple Vitamins-Calcium (ONE-A-DAY WOMENS FORMULA PO) One-A-Day Womens Formula 18 mg iron-400 mcg-500 mg oral tablet take 1 tablet by oral route daily   Active    Emergency, Nurse Roberto RN   omeprazole (priLOSEC) 20 MG capsule Take 1 capsule by mouth Daily. 11/16/21   Ana Arrieta, APRN   Red Yeast Rice Extract 600 MG  "tablet red yeast rice 600 mg oral tablet take 1 tablet by oral route daily for 90 days   Active    Emergency, Nurse Roberto RN   vitamin C (ASCORBIC ACID) 250 MG tablet Take 250 mg by mouth Daily.    Emergency, Nurse Epic, RN   vitamin E 100 UNIT capsule vitamin E 100 unit oral capsule take 1 capsule by oral route daily   Active    Emergency, Nurse Epic, RN   Zinc 50 MG capsule zinc 50 mg oral tablet take 1 tablet by oral route daily   Active    Emergency, Nurse Epic, RN        Allergies:  Patient has no known allergies.    Objective     Vital Signs        Physical Exam:     General Appearance:    Alert, cooperative, in no acute distress   Head:    Normocephalic, without obvious abnormality, atraumatic   Eyes:          Conjunctivae and sclerae normal, no icterus,     Ears:    Ears appear intact with no abnormalities noted   Throat:   No oral lesions, no thrush, oral mucosa moist   Neck:   No adenopathy, supple, trachea midline, no thyromegaly   Back:     No kyphosis present, no scoliosis present, no skin lesions,      erythema or scars, no tenderness to percussion or                   palpation,   range of motion normal   Lungs:     Clear to auscultation,respirations regular, even and                  unlabored    Heart:    Regular rhythm and normal rate, normal S1 and S2, no            murmur, no gallop, no rub, no click   Chest Wall:    No abnormalities observed   Abdomen:     Normal bowel sounds, no masses, no organomegaly, soft        non-tender, non-distended, no guarding, no rebound                tenderness   Rectal:        Extremities:   Moves all extremities well, no edema, no cyanosis, no             redness   Pulses:   Pulses palpable and equal bilaterally   Skin:   No bleeding, bruising or rash   Lymph nodes:   No palpable adenopathy   Neurologic:   A/o x 4 with no deficits       Results Review:   {Results Review:04932::\"I reviewed the patient's new clinical results.\"    LABS/IMAGING:  Results for orders " placed or performed during the hospital encounter of 11/27/21   COVID-19,CEPHEID/CARLOS/BDMAX,COR/MATI/PAD/KIEL IN-HOUSE(OR EMERGENT/ADD-ON),NP SWAB IN TRANSPORT MEDIA 3-4 HR TAT, RT-PCR - Swab, Nasopharynx    Specimen: Nasopharynx; Swab   Result Value Ref Range    COVID19 Not Detected Not Detected - Ref. Range   POCT Influenza A/B    Specimen: Swab   Result Value Ref Range    Rapid Influenza A Ag Negative Negative    Rapid Influenza B Ag Negative Negative    Internal Control Passed Passed    Lot Number [id number]     Expiration Date 5,122         Result Review :     Assessment/Plan     Substernal chest pain, history of hiatal hernia, questionable esophageal spasm, no chest pain with exertion.    I had a discussion with the patient.  I recommended referral to cardiology for further evaluation.  Also recommended EGD for evaluation.  Benefits and alternatives discussed.  Risk procedure including bleeding and perforation discussed.  All questions answered.  She agrees with the plan.  Orders placed.  Continue PPI.  Next colonoscopy recommended in 2024.  Thank you for the consult.           This document has been electronically signed by Lázaro Ortega MD  December 7, 2021 09:47 EST

## 2021-12-23 ENCOUNTER — LAB (OUTPATIENT)
Dept: LAB | Facility: HOSPITAL | Age: 51
End: 2021-12-23

## 2021-12-23 DIAGNOSIS — R07.2 SUBSTERNAL PAIN: Primary | ICD-10-CM

## 2021-12-23 PROCEDURE — C9803 HOPD COVID-19 SPEC COLLECT: HCPCS

## 2021-12-23 PROCEDURE — U0004 COV-19 TEST NON-CDC HGH THRU: HCPCS

## 2021-12-23 RX ORDER — MULTIPLE VITAMINS W/ MINERALS TAB 9MG-400MCG
1 TAB ORAL DAILY
COMMUNITY

## 2021-12-23 RX ORDER — VITAMIN E 268 MG
180 CAPSULE ORAL DAILY
COMMUNITY

## 2021-12-23 RX ORDER — PERPHENAZINE 8 MG
600 TABLET ORAL DAILY
COMMUNITY

## 2021-12-23 NOTE — PRE-PROCEDURE INSTRUCTIONS
Called patient to discuss NPO after midnight and pre-op medications. Patient aware she can take prilosec. Patient stated understanding. No other issues or concerns noted currently per patient.  Patient is vaccinated for covid-19.

## 2021-12-24 LAB — SARS-COV-2 RNA PNL SPEC NAA+PROBE: NOT DETECTED

## 2021-12-28 ENCOUNTER — ANESTHESIA EVENT (OUTPATIENT)
Dept: GASTROENTEROLOGY | Facility: HOSPITAL | Age: 51
End: 2021-12-28

## 2021-12-28 ENCOUNTER — ANESTHESIA (OUTPATIENT)
Dept: GASTROENTEROLOGY | Facility: HOSPITAL | Age: 51
End: 2021-12-28

## 2021-12-28 ENCOUNTER — HOSPITAL ENCOUNTER (OUTPATIENT)
Facility: HOSPITAL | Age: 51
Setting detail: HOSPITAL OUTPATIENT SURGERY
Discharge: HOME | End: 2021-12-28
Attending: SURGERY | Admitting: SURGERY

## 2021-12-28 VITALS
RESPIRATION RATE: 15 BRPM | HEART RATE: 69 BPM | BODY MASS INDEX: 34.31 KG/M2 | SYSTOLIC BLOOD PRESSURE: 130 MMHG | HEIGHT: 65 IN | TEMPERATURE: 97.2 F | DIASTOLIC BLOOD PRESSURE: 77 MMHG | WEIGHT: 205.91 LBS | OXYGEN SATURATION: 97 %

## 2021-12-28 DIAGNOSIS — R07.2 SUBSTERNAL PAIN: ICD-10-CM

## 2021-12-28 PROCEDURE — 25010000002 PROPOFOL 10 MG/ML EMULSION: Performed by: NURSE ANESTHETIST, CERTIFIED REGISTERED

## 2021-12-28 PROCEDURE — 88342 IMHCHEM/IMCYTCHM 1ST ANTB: CPT | Performed by: SURGERY

## 2021-12-28 PROCEDURE — 88305 TISSUE EXAM BY PATHOLOGIST: CPT | Performed by: SURGERY

## 2021-12-28 RX ORDER — PROMETHAZINE HYDROCHLORIDE 25 MG/1
25 TABLET ORAL ONCE AS NEEDED
Status: DISCONTINUED | OUTPATIENT
Start: 2021-12-28 | End: 2021-12-28 | Stop reason: HOSPADM

## 2021-12-28 RX ORDER — LIDOCAINE HYDROCHLORIDE 20 MG/ML
INJECTION, SOLUTION INFILTRATION; PERINEURAL AS NEEDED
Status: DISCONTINUED | OUTPATIENT
Start: 2021-12-28 | End: 2021-12-28 | Stop reason: SURG

## 2021-12-28 RX ORDER — OXYCODONE HYDROCHLORIDE 5 MG/1
5 TABLET ORAL
Status: DISCONTINUED | OUTPATIENT
Start: 2021-12-28 | End: 2021-12-28 | Stop reason: HOSPADM

## 2021-12-28 RX ORDER — SODIUM CHLORIDE 0.9 % (FLUSH) 0.9 %
10 SYRINGE (ML) INJECTION AS NEEDED
Status: DISCONTINUED | OUTPATIENT
Start: 2021-12-28 | End: 2021-12-28 | Stop reason: HOSPADM

## 2021-12-28 RX ORDER — SODIUM CHLORIDE, SODIUM LACTATE, POTASSIUM CHLORIDE, CALCIUM CHLORIDE 600; 310; 30; 20 MG/100ML; MG/100ML; MG/100ML; MG/100ML
30 INJECTION, SOLUTION INTRAVENOUS CONTINUOUS
Status: DISCONTINUED | OUTPATIENT
Start: 2021-12-28 | End: 2021-12-28 | Stop reason: HOSPADM

## 2021-12-28 RX ORDER — PROMETHAZINE HYDROCHLORIDE 25 MG/1
25 SUPPOSITORY RECTAL ONCE AS NEEDED
Status: DISCONTINUED | OUTPATIENT
Start: 2021-12-28 | End: 2021-12-28 | Stop reason: HOSPADM

## 2021-12-28 RX ORDER — SODIUM CHLORIDE 0.9 % (FLUSH) 0.9 %
3 SYRINGE (ML) INJECTION EVERY 12 HOURS SCHEDULED
Status: DISCONTINUED | OUTPATIENT
Start: 2021-12-28 | End: 2021-12-28 | Stop reason: HOSPADM

## 2021-12-28 RX ORDER — ONDANSETRON 2 MG/ML
4 INJECTION INTRAMUSCULAR; INTRAVENOUS ONCE AS NEEDED
Status: DISCONTINUED | OUTPATIENT
Start: 2021-12-28 | End: 2021-12-28 | Stop reason: HOSPADM

## 2021-12-28 RX ORDER — MEPERIDINE HYDROCHLORIDE 25 MG/ML
12.5 INJECTION INTRAMUSCULAR; INTRAVENOUS; SUBCUTANEOUS
Status: DISCONTINUED | OUTPATIENT
Start: 2021-12-28 | End: 2021-12-28 | Stop reason: HOSPADM

## 2021-12-28 RX ADMIN — SODIUM CHLORIDE, POTASSIUM CHLORIDE, SODIUM LACTATE AND CALCIUM CHLORIDE 30 ML/HR: 600; 310; 30; 20 INJECTION, SOLUTION INTRAVENOUS at 13:04

## 2021-12-28 RX ADMIN — PROPOFOL 250 MCG/KG/MIN: 10 INJECTION, EMULSION INTRAVENOUS at 13:29

## 2021-12-28 RX ADMIN — LIDOCAINE HYDROCHLORIDE 100 MG: 20 INJECTION, SOLUTION INFILTRATION; PERINEURAL at 13:29

## 2021-12-28 NOTE — ANESTHESIA PREPROCEDURE EVALUATION
Anesthesia Evaluation     Patient summary reviewed and Nursing notes reviewed   no history of anesthetic complications:  NPO Solid Status: > 8 hours  NPO Liquid Status: > 2 hours           Airway   Mallampati: II  TM distance: >3 FB  Neck ROM: full  No difficulty expected  Dental      Pulmonary - negative pulmonary ROS and normal exam    breath sounds clear to auscultation  Cardiovascular - normal exam  Exercise tolerance: good (4-7 METS)    Rhythm: regular    (+) hyperlipidemia,       Neuro/Psych- negative ROS  GI/Hepatic/Renal/Endo    (+)   renal disease,     Musculoskeletal (-) negative ROS    Abdominal    Substance History - negative use     OB/GYN negative ob/gyn ROS         Other - negative ROS                     Anesthesia Plan    ASA 2     general   (Total IV Anesthesia    Patient understands anesthesia not responsible for dental damage.  )  intravenous induction     Anesthetic plan, all risks, benefits, and alternatives have been provided, discussed and informed consent has been obtained with: patient.    Plan discussed with CRNA.

## 2021-12-28 NOTE — ANESTHESIA POSTPROCEDURE EVALUATION
Patient: Loreto Schumacher    Procedure Summary     Date: 12/28/21 Room / Location: [facility] ENDOSCOPY 2 / [facility] ENDOSCOPY    Anesthesia Start: 1325 Anesthesia Stop: 1337    Procedure: ESOPHAGOGASTRODUODENOSCOPY (N/A ) Diagnosis:       Substernal pain      (Substernal pain [R07.2])    Surgeons: Lázaro Ortega MD Provider: Varun Walker MD    Anesthesia Type: general ASA Status: 2          Anesthesia Type: general    Vitals  Vitals Value Taken Time   /60 12/28/21 1340   Temp 36.3 °C (97.4 °F) 12/28/21 1340   Pulse 72 12/28/21 1340   Resp 21 12/28/21 1340   SpO2 95 % 12/28/21 1340           Post Anesthesia Care and Evaluation    Patient location during evaluation: bedside  Patient participation: complete - patient participated  Level of consciousness: awake  Pain score: 0  Pain management: adequate  Airway patency: patent  Anesthetic complications: No anesthetic complications  PONV Status: none  Cardiovascular status: acceptable and stable  Respiratory status: acceptable and room air  Hydration status: acceptable    Comments: An Anesthesiologist personally participated in the most demanding procedures (including induction and emergence if applicable) in the anesthesia plan, monitored the course of anesthesia administration at frequent intervals and remained physically present and available for immediate diagnosis and treatment of emergencies.

## 2021-12-30 LAB
CYTO UR: NORMAL
LAB AP CASE REPORT: NORMAL
LAB AP CLINICAL INFORMATION: NORMAL
LAB AP SPECIAL STAINS: NORMAL
PATH REPORT.FINAL DX SPEC: NORMAL
PATH REPORT.GROSS SPEC: NORMAL

## 2022-01-11 ENCOUNTER — OFFICE VISIT (OUTPATIENT)
Dept: SURGERY | Facility: CLINIC | Age: 52
End: 2022-01-11

## 2022-01-11 VITALS — BODY MASS INDEX: 34.89 KG/M2 | RESPIRATION RATE: 14 BRPM | WEIGHT: 209.4 LBS | HEIGHT: 65 IN

## 2022-01-11 DIAGNOSIS — R10.13 EPIGASTRIC PAIN: Primary | ICD-10-CM

## 2022-01-11 PROCEDURE — 99212 OFFICE O/P EST SF 10 MIN: CPT | Performed by: SURGERY

## 2022-01-11 NOTE — PROGRESS NOTES
"General Surgery/Colorectal Surgery Note    Patient Name:  Loreto Schumacher  YOB: 1970  [id number]    Referring Provider: Lázaro Ortega, *      Patient Care Team:  Consuelo Arteaga APRN as PCP - General (Family Medicine)  Lázaro Ortega MD as Consulting Physician (General Surgery)    Chief complaint follow-up endoscopy    Subjective .     History of present illness:    history of significant heavy lifting since 2012 with her job.  In 2017 or 18 she had a episode of substernal chest pain which awakened her at night.  She said she had a negative heart work-up at that time.  She said that she was told it was likely due to hiatal hernia.  She denies any reflux symptoms.  No acid taste in her mouth.  She does not have to sleep sitting up.  She recently had a flareup about 3 weeks ago awakening with substernal chest pain.  She was started on Prilosec recently.  She denies any chest pain with exertion.  No tobacco use.  No blood thinner use.  No history of ulcer disease.  No NSAID abuse.  No previous upper endoscopy.  No weight loss.  No family history of esophageal or gastric cancer.  Her father had colorectal cancer.  Colonoscopy up-to-date by Dr. Grijalva in 2019.  CT abdomen and pelvis with \"tiny\" hiatal hernia January 2020.    Status post EGD 12/28/2021 with duodenum normal, pylorus normal, antrum normal, multiple small polyps consistent with fundic gland polyps, GE junction with no hiatal hernia, esophagitis, stricture  Biopsy of pylorus negative for celiac, antral biopsy negative for H. pylori, mid esophageal biopsy negative for eosinophilic esophagitis    She comes in for follow-up.  On further questioning her epigastric subxiphoid pain is related to lifting and movement.  She denies reflux.      History:  Past Medical History:   Diagnosis Date   • Hyperlipidemia        Past Surgical History:   Procedure Laterality Date   • APPENDECTOMY     • CYST REMOVAL      THIGH, ELBOW, HEAD   • " ENDOSCOPY N/A 12/28/2021    Procedure: ESOPHAGOGASTRODUODENOSCOPY;  Surgeon: Lázaro Ortega MD;  Location: [facility] ENDOSCOPY;  Service: General;  Laterality: N/A;  abdomina pain   • HYSTERECTOMY     • KIDNEY STONE SURGERY         Family History   Problem Relation Age of Onset   • Hyperlipidemia Mother    • Heart disease Mother    • Cancer Father    • Diabetes Father    • Malig Hyperthermia Neg Hx        Social History     Tobacco Use   • Smoking status: Never Smoker   • Smokeless tobacco: Never Used   Vaping Use   • Vaping Use: Never used   Substance Use Topics   • Alcohol use: Yes     Comment: 1 drink maybe yearly    • Drug use: Never       Review of Systems  All systems were reviewed and negative except for:   Review of Systems   Constitutional: Negative for chills, fever and unexpected weight loss.   HENT: Negative for congestion, nosebleeds and voice change.    Eyes: Negative for blurred vision, double vision and discharge.   Respiratory: Negative for apnea, chest tightness and shortness of breath.    Cardiovascular: Negative for chest pain and leg swelling.   Gastrointestinal:        See HPI   Endocrine: Negative for cold intolerance and heat intolerance.   Genitourinary: Negative for dysuria, hematuria and urgency.   Musculoskeletal: Negative for back pain, joint swelling and neck pain.   Skin: Negative for color change and dry skin.   Neurological: Negative for dizziness and confusion.   Hematological: Negative for adenopathy.   Psychiatric/Behavioral: Negative for agitation and behavioral problems.     MEDS:  Prior to Admission medications    Medication Sig Start Date End Date Taking? Authorizing Provider   Acetylcysteine (NAC) 500 MG capsule Take 600 mg by mouth Daily.   Yes Provider, MD Alex   Cholecalciferol 125 MCG (5000 UT) tablet Vitamin D3 125 mcg (5,000 unit) oral tablet take 1 tablet by oral route daily   Active   Yes Emergency, Nurse Roberto, RN   hydroxychloroquine (PLAQUENIL) 200 MG  tablet hydroxychloroquine 200 mg oral tablet take 1 tablet (200 mg) by oral route every Sunday   Active   Yes Emergency, Nurse Roberto RN   multivitamin with minerals (HAIR SKIN AND NAILS FORMULA PO) Take 1 tablet by mouth Daily.   Yes Provider, MD Alex   omeprazole (priLOSEC) 20 MG capsule Take 1 capsule by mouth Daily. 11/16/21  Yes Ana Arrieta APRN   Red Yeast Rice Extract 600 MG tablet red yeast rice 600 mg oral tablet take 1 tablet by oral route daily for 90 days   Active   Yes Emergency, Nurse Roberto RN   vitamin C (ASCORBIC ACID) 250 MG tablet Take 1,000 mg by mouth Daily.   Yes Emergency, Nurse Roberto RN   Vitamin E 180 MG (400 UNIT) capsule capsule Take 180 mg by mouth Daily.   Yes Provider, MD Alex   Zinc 50 MG capsule zinc 50 mg oral tablet take 1 tablet by oral route daily   Active   Yes Emergency, Nurse Roberto RN        Allergies:  Patient has no known allergies.    Objective     Vital Signs   Resp:  [14] 14    Physical Exam:     General Appearance:    Alert, cooperative, in no acute distress   Head:    Normocephalic, without obvious abnormality, atraumatic   Eyes:          Conjunctivae and sclerae normal, no icterus,     Ears:    Ears appear intact with no abnormalities noted   Throat:   No oral lesions, no thrush, oral mucosa moist   Neck:   No adenopathy, supple, trachea midline, no thyromegaly   Back:     No kyphosis present, no scoliosis present, no skin lesions,      erythema or scars, no tenderness to percussion or                   palpation,   range of motion normal   Lungs:     Clear to auscultation,respirations regular, even and                  unlabored    Heart:    Regular rhythm and normal rate, normal S1 and S2, no            murmur, no gallop, no rub, no click   Chest Wall:    No abnormalities observed   Abdomen:     Normal bowel sounds, no masses, no organomegaly, soft        non-tender, non-distended, no guarding, no rebound                tenderness, no hernia,  "unable to reproduce pain to subxiphoid area   Rectal:        Extremities:   Moves all extremities well, no edema, no cyanosis, no             redness   Pulses:   Pulses palpable and equal bilaterally   Skin:   No bleeding, bruising or rash   Lymph nodes:   No palpable adenopathy   Neurologic:   A/o x 4 with no deficits       Results Review:   {Results Review:67466::\"I reviewed the patient's new clinical results.\"    LABS/IMAGING:  Results for orders placed or performed during the hospital encounter of 12/28/21   Tissue Pathology Exam    Specimen: A: Small Intestine; Tissue    B: Small Intestine; Tissue    C: Gastric, Antrum; Tissue    D: Stomach; Tissue    E: Esophagus, Mid; Tissue   Result Value Ref Range    Case Report       Surgical Pathology Report                         Case: [id number]                                  Authorizing Provider:  Lázaro Ortega MD  Collected:           12/28/2021 01:32 PM          Ordering Location:     [facility] Received:            12/29/2021 10:08 AM                                 SUITES                                                                       Pathologist:           Denise Covarrubias MD                                                     Specimens:   1) - Small Intestine, duodenum bx                                                                   2) - Small Intestine, pylorus bx                                                                    3) - Gastric, Antrum, antrum bx                                                                     4) - Stomach, gastric polyp                                                                         5) - Esophagus, Mid, mid esophagus bx                                                      Clinical Information      Final Diagnosis       1. Duodenum, biopsy:    - No significant pathologic change    - Preserved villous architecture and no increase in intraepithelial lymphocytes      2. Stomach, " "pylorus, biopsy:    - Gastric antrum mucosa with intestinal metaplasia and chronic focally active gastritis    -Negative for Helicobacter pylori on immunohistochemical stain   - Negative for dysplasia and malignancy      3. Stomach, antrum, biopsy:    - Gastric body/fundus mucosa with no significant pathologic change    - Negative for Helicobacter pylori on routine H&E stain    - Negative for intestinal metaplasia, dysplasia and malignancy      4. Stomach polyp, biopsy:    - Fundic gland polyp      5. Mid esophagus, biopsy:    - Squamous mucosa with no significant pathologic change    - Negative for eosinophilic esophagitis          Gross Description       Part 1 is received in formalin labeled \" duodenum\".  The specimen consists of 1 pale-tan irregular friable 0.4 cm soft tissue fragment all 1A.      Part 2 is received in formalin labeled \" pylorus\".  The specimen consists of 1 pale-tan irregular friable 0.3 cm soft tissue fragment all 2A.      Part 3 is received in formalin labeled \" antrum biopsy\".  The specimen consists of 0.3 cm pale-tan irregular friable soft tissue fragment all 3A.     Part 4 is received in formalin labeled \" gastric polyp\".  The specimen consists of 0.4 cm pale-tan irregular friable tissue fragment all 4A.      Part 5 is received in formalin labeled \" midesophagus biopsy\".  The specimen consists of 0.4 cm pale-tan irregular friable soft tissue fragment all 5A.       Special Stains       An immunoperoxidase stain for Helicobacter pylori was performed to aid in its detection since it was not readily identified on H&E stain.  All immunohistochemical/cytochemical stains (IHC) are performed on separate slides per different antibody unless otherwise specified in the documentation that a cocktail (multiple stain) was performed.  Controls are appropriate.        Microscopic Description          Result Review :     Assessment/Plan     Status post EGD 12/28/2021 with duodenum normal, pylorus normal, " antrum normal, multiple small polyps consistent with fundic gland polyps, GE junction with no hiatal hernia, esophagitis, stricture  Biopsy of pylorus negative for celiac, antral biopsy negative for H. pylori, mid esophageal biopsy negative for eosinophilic esophagitis    I had a discussion with the patient.  I reviewed her endoscopy findings.  I reviewed the pathology.  I explained her she may be having esophageal spasm and recommend referral to GI for further evaluation and treatment.  She wishes to hold off until she has a cardiac evaluation in the near future.  Follow-up with me as needed.  All questions answered.  She agrees with the plan.  Thank for the consult.              This document has been electronically signed by Lázaro Ortega MD  January 11, 2022 14:35 EST

## 2022-03-14 ENCOUNTER — OFFICE VISIT (OUTPATIENT)
Dept: CARDIOLOGY | Facility: CLINIC | Age: 52
End: 2022-03-14

## 2022-03-14 VITALS
SYSTOLIC BLOOD PRESSURE: 150 MMHG | HEIGHT: 65 IN | HEART RATE: 69 BPM | DIASTOLIC BLOOD PRESSURE: 85 MMHG | WEIGHT: 208 LBS | BODY MASS INDEX: 34.66 KG/M2

## 2022-03-14 DIAGNOSIS — R07.2 PRECORDIAL PAIN: Primary | ICD-10-CM

## 2022-03-14 PROCEDURE — 93000 ELECTROCARDIOGRAM COMPLETE: CPT | Performed by: INTERNAL MEDICINE

## 2022-03-14 PROCEDURE — 99204 OFFICE O/P NEW MOD 45 MIN: CPT | Performed by: INTERNAL MEDICINE

## 2022-03-14 NOTE — PROGRESS NOTES
Chief Complaint  Hiatal Hernia    Subjective            Loreto Schumacher presents to [facility] CARDIOLOGY  History of Present Illness    51-year-old white female.  She has no prior cardiac problems.  She has had over the last 3 to 4 years intermittent episodes of epigastric and chest discomfort.  Episodes flareup somewhat randomly, they may go on for several days and then resolve with long periods of time of no symptoms whatsoever.  Over the past several months she has had recurrent epigastric discomfort, it is not exertional, moderately intense, sometimes tender to the touch.  It is not specifically with walking or exercise.  She has been evaluated by general surgery/GI and a cardiac evaluation also has been requested.  She has dyslipidemia and is intolerant to statin agents.  Otherwise she is a non-smoker, no diabetes.  She does have some degree of family history with her mother having had CABG in her 40s.    PMH  Past Medical History:   Diagnosis Date   • Hyperlipidemia          SURGICALHX  Past Surgical History:   Procedure Laterality Date   • APPENDECTOMY     • CYST REMOVAL      THIGH, ELBOW, HEAD   • ENDOSCOPY N/A 12/28/2021    Procedure: ESOPHAGOGASTRODUODENOSCOPY;  Surgeon: Lázaro Ortega MD;  Location: [facility] ENDOSCOPY;  Service: General;  Laterality: N/A;  abdomina pain   • HYSTERECTOMY     • KIDNEY STONE SURGERY          SOC  Social History     Socioeconomic History   • Marital status:    Tobacco Use   • Smoking status: Never Smoker   • Smokeless tobacco: Never Used   Vaping Use   • Vaping Use: Never used   Substance and Sexual Activity   • Alcohol use: Yes     Comment: 1 drink maybe yearly    • Drug use: Never   • Sexual activity: Defer         FAMHX  Family History   Problem Relation Age of Onset   • Hyperlipidemia Mother    • Heart disease Mother    • Cancer Father    • Diabetes Father    • Malig Hyperthermia Neg Hx           ALLERGY  No Known Allergies  "    MEDSCURRENT    Current Outpatient Medications:   •  Acetylcysteine (NAC) 500 MG capsule, Take 600 mg by mouth Daily., Disp: , Rfl:   •  Cholecalciferol 125 MCG (5000 UT) tablet, Vitamin D3 125 mcg (5,000 unit) oral tablet take 1 tablet by oral route daily   Active, Disp: , Rfl:   •  hydroxychloroquine (PLAQUENIL) 200 MG tablet, Every 7 (Seven) Days., Disp: , Rfl:   •  multivitamin with minerals tablet tablet, Take 1 tablet by mouth Daily., Disp: , Rfl:   •  Red Yeast Rice Extract 600 MG tablet, red yeast rice 600 mg oral tablet take 1 tablet by oral route daily for 90 days   Active, Disp: , Rfl:   •  vitamin C (ASCORBIC ACID) 250 MG tablet, Take 1,000 mg by mouth Daily., Disp: , Rfl:   •  Vitamin E 180 MG (400 UNIT) capsule capsule, Take 180 mg by mouth Daily., Disp: , Rfl:   •  Zinc 50 MG capsule, zinc 50 mg oral tablet take 1 tablet by oral route daily   Active, Disp: , Rfl:   •  omeprazole (priLOSEC) 20 MG capsule, Take 1 capsule by mouth Daily., Disp: 30 capsule, Rfl: 1      Review of Systems   Constitutional: Negative.   HENT: Negative.    Eyes: Negative.    Cardiovascular: Positive for chest pain.   Respiratory: Negative.    Endocrine: Negative.    Hematologic/Lymphatic: Negative.    Skin: Negative.    Musculoskeletal: Negative.    Gastrointestinal: Negative.    Genitourinary: Negative.    Neurological: Negative.    Psychiatric/Behavioral: Negative.         Objective     /85   Pulse 69   Ht 165.1 cm (65\")   Wt 94.3 kg (208 lb)   BMI 34.61 kg/m²       General Appearance:   · well developed  · well nourished, obese  HENT:   · oropharynx moist  · lips not cyanotic  Neck:  · thyroid not enlarged  · supple  Respiratory:  · no respiratory distress  · normal breath sounds  · no rales  Cardiovascular:  · no jugular venous distention  · regular rhythm  · apical impulse normal  · S1 normal, S2 normal  · no S3, no S4   · no murmur  · no rub, no thrill  · carotid pulses normal; no bruit  · pedal pulses " normal  · lower extremity edema: none    Musculoskeletal:  · no clubbing of fingers.   · normocephalic, head atraumatic  Skin:   · warm, dry  Psychiatric:  · judgement and insight appropriate  · normal mood and affect      Result Review :     The following data was reviewed by: Elia West MD on 03/14/2022:                      Data reviewed: Laboratory studies reviewed, primary care records reviewed.  General surgery evaluation reviewed.       ECG 12 Lead    Date/Time: 3/14/2022 9:36 AM  Performed by: MINA West MD  Authorized by: MINA West MD   Previous ECG: no previous ECG available  Rhythm: sinus rhythm  Conduction: conduction normal  ST Segments: ST segments normal  T Waves: T waves normal  QRS axis: normal  Other: no other findings    Clinical impression: normal ECG                        Assessment and Plan        ASSESSMENT:  Encounter Diagnosis   Name Primary?   • Precordial pain Yes         PLAN:    1.  Loreto has atypical chest discomfort.  It does not sound likely cardiac by history but she does have risk factors including dyslipidemia, obesity and family history.  Her baseline EKG is normal.  Stress imaging and echo will be scheduled for further evaluation.  If her noninvasive work-up is low risk at this time no additional cardiac evaluation would be necessary.  2.  Weight loss counseling, primary care follow-up to evaluate routine health maintenance metrics.  3.  We will call the patient with results when available.          Patient was given instructions and counseling regarding her condition or for health maintenance advice. Please see specific information pulled into the AVS if appropriate.             MINA West MD  3/14/2022    09:34 EDT

## 2022-04-07 ENCOUNTER — APPOINTMENT (OUTPATIENT)
Dept: NUCLEAR MEDICINE | Facility: HOSPITAL | Age: 52
End: 2022-04-07

## 2022-11-02 ENCOUNTER — HOSPITAL ENCOUNTER (OUTPATIENT)
Dept: GENERAL RADIOLOGY | Facility: HOSPITAL | Age: 52
Discharge: HOME | End: 2022-11-02
Admitting: NURSE PRACTITIONER

## 2022-11-02 ENCOUNTER — OFFICE VISIT (OUTPATIENT)
Dept: FAMILY MEDICINE CLINIC | Facility: CLINIC | Age: 52
End: 2022-11-02

## 2022-11-02 VITALS
WEIGHT: 207.4 LBS | TEMPERATURE: 98.9 F | OXYGEN SATURATION: 99 % | HEART RATE: 87 BPM | HEIGHT: 65 IN | BODY MASS INDEX: 34.55 KG/M2 | SYSTOLIC BLOOD PRESSURE: 130 MMHG | DIASTOLIC BLOOD PRESSURE: 74 MMHG

## 2022-11-02 DIAGNOSIS — R05.1 ACUTE COUGH: ICD-10-CM

## 2022-11-02 DIAGNOSIS — J10.1 INFLUENZA A: Primary | ICD-10-CM

## 2022-11-02 DIAGNOSIS — R50.9 FEVER, UNSPECIFIED FEVER CAUSE: ICD-10-CM

## 2022-11-02 LAB
EXPIRATION DATE: ABNORMAL
FLUAV AG UPPER RESP QL IA.RAPID: DETECTED
FLUBV AG UPPER RESP QL IA.RAPID: NOT DETECTED
INTERNAL CONTROL: ABNORMAL
Lab: ABNORMAL
SARS-COV-2 AG UPPER RESP QL IA.RAPID: NOT DETECTED

## 2022-11-02 PROCEDURE — 71046 X-RAY EXAM CHEST 2 VIEWS: CPT

## 2022-11-02 PROCEDURE — 99213 OFFICE O/P EST LOW 20 MIN: CPT | Performed by: NURSE PRACTITIONER

## 2022-11-02 PROCEDURE — 87428 SARSCOV & INF VIR A&B AG IA: CPT | Performed by: NURSE PRACTITIONER

## 2022-11-02 RX ORDER — BENZONATATE 100 MG/1
100 CAPSULE ORAL 3 TIMES DAILY PRN
Qty: 30 CAPSULE | Refills: 0 | Status: SHIPPED | OUTPATIENT
Start: 2022-11-02 | End: 2022-12-10 | Stop reason: SDUPTHER

## 2022-11-02 RX ORDER — OSELTAMIVIR PHOSPHATE 75 MG/1
CAPSULE ORAL
Qty: 10 CAPSULE | Refills: 0 | Status: SHIPPED | OUTPATIENT
Start: 2022-11-02

## 2022-11-02 NOTE — PROGRESS NOTES
Chief Complaint  Cough and Fever    LIZBETH Schumacher presents to [facility]     Patient complaining of productive cough - brown/green - since Saturday.  Patient admits to low grade fever (99.7), chest congestion.  Patient denies nasal congestion.  Patient has been taking Extra Strength Tylenol for chest wall pain from coughing, she has not tried OTC cough suppressants.  Patient has not been vaccinated for Covid 19, she refuses Influenza vaccine.  Patient refuses to be tested for Covid 19.  Denies any sore throat, headache, runny nose or sneezing. Denies any nausea/vomiting/diarrhea. Pt has been taking tylenol prn.        History of Present Illness  Past Medical History:   Diagnosis Date   • Hyperlipidemia       Family History   Problem Relation Age of Onset   • Hyperlipidemia Mother    • Heart disease Mother    • Depression Mother    • Hypertension Mother    • Cancer Father    • Diabetes Father    • Alcohol abuse Father    • COPD Father    • Depression Father    • Hearing loss Father    • Malig Hyperthermia Neg Hx       Past Surgical History:   Procedure Laterality Date   • APPENDECTOMY     • CYST REMOVAL      THIGH, ELBOW, HEAD   • ENDOSCOPY N/A 12/28/2021    Procedure: ESOPHAGOGASTRODUODENOSCOPY;  Surgeon: Lázaro Ortega MD;  Location: [facility] ENDOSCOPY;  Service: General;  Laterality: N/A;  abdomina pain   • HYSTERECTOMY     • KIDNEY STONE SURGERY          Current Outpatient Medications:   •  Acetylcysteine (NAC) 500 MG capsule, Take 600 mg by mouth Daily., Disp: , Rfl:   •  Biotin w/ Vitamins C & E (HAIR SKIN & NAILS GUMMIES PO), Take 1 each by mouth Daily., Disp: , Rfl:   •  Cholecalciferol 125 MCG (5000 UT) tablet, Vitamin D3 125 mcg (5,000 unit) oral tablet take 1 tablet by oral route daily   Active, Disp: , Rfl:   •  hydroxychloroquine (PLAQUENIL) 200 MG tablet, Every 7 (Seven) Days., Disp: , Rfl:   •  multivitamin with minerals tablet tablet, Take 1  "tablet by mouth Daily., Disp: , Rfl:   •  Red Yeast Rice Extract 600 MG tablet, red yeast rice 600 mg oral tablet take 1 tablet by oral route daily for 90 days   Active, Disp: , Rfl:   •  vitamin C (ASCORBIC ACID) 250 MG tablet, Take 1,000 mg by mouth Daily., Disp: , Rfl:   •  Vitamin E 180 MG (400 UNIT) capsule capsule, Take 180 mg by mouth Daily., Disp: , Rfl:   •  Zinc 50 MG capsule, zinc 50 mg oral tablet take 1 tablet by oral route daily   Active, Disp: , Rfl:   •  benzonatate (Tessalon Perles) 100 MG capsule, Take 1 capsule by mouth 3 (Three) Times a Day As Needed for Cough., Disp: 30 capsule, Rfl: 0  •  oseltamivir (Tamiflu) 75 MG capsule, Take 1 tablet by mouth twice daily for 5 days, Disp: 10 capsule, Rfl: 0    OBJECTIVE  Vital Signs:   /74 (BP Location: Left arm, Patient Position: Sitting, Cuff Size: Adult)   Pulse 87   Temp 98.9 °F (37.2 °C) (Oral)   Ht 165.1 cm (65\")   Wt 94.1 kg (207 lb 6.4 oz)   SpO2 99%   BMI 34.51 kg/m²    Estimated body mass index is 34.51 kg/m² as calculated from the following:    Height as of this encounter: 165.1 cm (65\").    Weight as of this encounter: 94.1 kg (207 lb 6.4 oz).     Wt Readings from Last 3 Encounters:   11/02/22 94.1 kg (207 lb 6.4 oz)   03/14/22 94.3 kg (208 lb)   01/11/22 95 kg (209 lb 6.4 oz)     BP Readings from Last 3 Encounters:   11/02/22 130/74   03/14/22 150/85   12/28/21 130/77       Physical Exam  Vitals reviewed.   Constitutional:       Appearance: Normal appearance. She is well-developed.   HENT:      Head: Normocephalic and atraumatic.      Right Ear: Hearing, tympanic membrane, ear canal and external ear normal.      Left Ear: Hearing, ear canal and external ear normal.      Ears:      Comments: Left TM dull     Mouth/Throat:      Pharynx: Posterior oropharyngeal erythema present. No oropharyngeal exudate.      Comments: Postnasal drip noted  Eyes:      Conjunctiva/sclera: Conjunctivae normal.      Pupils: Pupils are equal, round, and " reactive to light.   Cardiovascular:      Rate and Rhythm: Normal rate and regular rhythm.      Heart sounds: No murmur heard.    No friction rub. No gallop.   Pulmonary:      Effort: Pulmonary effort is normal.      Breath sounds: Normal breath sounds. No wheezing or rhonchi.   Skin:     General: Skin is warm and dry.   Neurological:      Mental Status: She is alert and oriented to person, place, and time.      Cranial Nerves: No cranial nerve deficit.   Psychiatric:         Mood and Affect: Mood and affect normal.         Behavior: Behavior normal.         Thought Content: Thought content normal.         Judgment: Judgment normal.          Result Review    SARS Antigen   Date Value Ref Range Status   11/02/2022 Not Detected Not Detected, Presumptive Negative Final     Influenza A Antigen SERGO   Date Value Ref Range Status   11/02/2022 Detected (A) Not Detected Final     Influenza B Antigen SERGO   Date Value Ref Range Status   11/02/2022 Not Detected Not Detected Final     Internal Control   Date Value Ref Range Status   11/02/2022 Passed Passed Final     Lot Number   Date Value Ref Range Status   11/02/2022 1,[id number]  Final     Expiration Date   Date Value Ref Range Status   11/02/2022 11/6/22  Final       No Images in the past 120 days found..      The above data has been reviewed by ROBYN Sommers 11/02/2022 09:55 EDT.          Patient Care Team:  Consuelo Arteaga APRN as PCP - General (Family Medicine)  Lázaro Ortega MD as Consulting Physician (General Surgery)    BMI is >= 30 and <35. (Class 1 Obesity). The following options were offered after discussion;: weight loss educational material (shared in after visit summary)       ASSESSMENT & PLAN    Diagnoses and all orders for this visit:    1. Influenza A (Primary)  Comments:  Tamiflu twice daily, side effects administration addressed.  Tylenol and Motrin as needed for fever.  Patient advised no work until fever free for 24 hours.  Orders:  -      oseltamivir (Tamiflu) 75 MG capsule; Take 1 tablet by mouth twice daily for 5 days  Dispense: 10 capsule; Refill: 0    2. Acute cough  Comments:  Will check chest x-ray, Tessalon Perles 3 times daily as needed, side effect and administration addressed.  Orders:  -     XR Chest PA & Lateral; Future  -     benzonatate (Tessalon Perles) 100 MG capsule; Take 1 capsule by mouth 3 (Three) Times a Day As Needed for Cough.  Dispense: 30 capsule; Refill: 0    3. Fever, unspecified fever cause  Comments:  Tylenol and Motrin as needed for fever and body aches.  Orders:  -     XR Chest PA & Lateral; Future         Tobacco Use: Low Risk    • Smoking Tobacco Use: Never   • Smokeless Tobacco Use: Never   • Passive Exposure: Not on file       Follow Up     Return in about 3 months (around 2/2/2023), or if symptoms worsen or fail to improve, for Annual physical.      Patient was given instructions and counseling regarding her condition or for health maintenance advice. Please see specific information pulled into the AVS if appropriate.   I have reviewed information obtained and documented by others and I have confirmed the accuracy of this documented note.    Consuelo Arteaga, APRN

## 2023-01-09 ENCOUNTER — TRANSCRIBE ORDERS (OUTPATIENT)
Dept: ADMINISTRATIVE | Facility: HOSPITAL | Age: 53
End: 2023-01-09
Payer: COMMERCIAL

## 2023-01-09 DIAGNOSIS — Z12.31 ENCOUNTER FOR SCREENING MAMMOGRAM FOR BREAST CANCER: Primary | ICD-10-CM

## 2023-01-24 ENCOUNTER — HOSPITAL ENCOUNTER (OUTPATIENT)
Dept: MAMMOGRAPHY | Facility: HOSPITAL | Age: 53
Discharge: HOME | End: 2023-01-24
Admitting: NURSE PRACTITIONER
Payer: COMMERCIAL

## 2023-01-24 DIAGNOSIS — Z12.31 ENCOUNTER FOR SCREENING MAMMOGRAM FOR BREAST CANCER: ICD-10-CM

## 2023-01-24 PROCEDURE — 77067 SCR MAMMO BI INCL CAD: CPT

## 2023-01-24 PROCEDURE — 77063 BREAST TOMOSYNTHESIS BI: CPT

## 2023-05-09 ENCOUNTER — TELEPHONE (OUTPATIENT)
Dept: FAMILY MEDICINE CLINIC | Facility: CLINIC | Age: 53
End: 2023-05-09
Payer: COMMERCIAL

## 2023-05-09 NOTE — TELEPHONE ENCOUNTER
Patient called wanting to know if she could get her blood drawn before her appt in august, if so can she put in an order for the thyroids.    Please advise, thank you

## 2023-08-02 ENCOUNTER — OFFICE VISIT (OUTPATIENT)
Dept: FAMILY MEDICINE CLINIC | Facility: CLINIC | Age: 53
End: 2023-08-02
Payer: COMMERCIAL

## 2023-08-02 ENCOUNTER — LAB (OUTPATIENT)
Dept: LAB | Facility: HOSPITAL | Age: 53
End: 2023-08-02
Payer: COMMERCIAL

## 2023-08-02 VITALS
OXYGEN SATURATION: 98 % | SYSTOLIC BLOOD PRESSURE: 134 MMHG | WEIGHT: 208.4 LBS | TEMPERATURE: 98.3 F | DIASTOLIC BLOOD PRESSURE: 70 MMHG | HEART RATE: 66 BPM | BODY MASS INDEX: 34.72 KG/M2 | HEIGHT: 65 IN

## 2023-08-02 DIAGNOSIS — R63.5 WEIGHT GAIN: ICD-10-CM

## 2023-08-02 DIAGNOSIS — Z00.00 ANNUAL PHYSICAL EXAM: ICD-10-CM

## 2023-08-02 DIAGNOSIS — Z00.00 ANNUAL PHYSICAL EXAM: Primary | ICD-10-CM

## 2023-08-02 DIAGNOSIS — Z01.419 WELL WOMAN EXAM: ICD-10-CM

## 2023-08-02 DIAGNOSIS — Z12.31 SCREENING MAMMOGRAM, ENCOUNTER FOR: ICD-10-CM

## 2023-08-02 DIAGNOSIS — Z11.59 ENCOUNTER FOR HEPATITIS C SCREENING TEST FOR LOW RISK PATIENT: ICD-10-CM

## 2023-08-02 DIAGNOSIS — N95.1 MENOPAUSAL VASOMOTOR SYNDROME: ICD-10-CM

## 2023-08-02 LAB
25(OH)D3 SERPL-MCNC: 36.2 NG/ML (ref 30–100)
ALBUMIN SERPL-MCNC: 4.6 G/DL (ref 3.5–5.2)
ALBUMIN/GLOB SERPL: 1.8 G/DL
ALP SERPL-CCNC: 77 U/L (ref 39–117)
ALT SERPL W P-5'-P-CCNC: 32 U/L (ref 1–33)
ANION GAP SERPL CALCULATED.3IONS-SCNC: 11.4 MMOL/L (ref 5–15)
AST SERPL-CCNC: 30 U/L (ref 1–32)
BASOPHILS # BLD AUTO: 0.05 10*3/MM3 (ref 0–0.2)
BASOPHILS NFR BLD AUTO: 0.7 % (ref 0–1.5)
BILIRUB SERPL-MCNC: 0.5 MG/DL (ref 0–1.2)
BUN SERPL-MCNC: 11 MG/DL (ref 6–20)
BUN/CREAT SERPL: 14.1 (ref 7–25)
CALCIUM SPEC-SCNC: 9.9 MG/DL (ref 8.6–10.5)
CHLORIDE SERPL-SCNC: 100 MMOL/L (ref 98–107)
CHOLEST SERPL-MCNC: 315 MG/DL (ref 0–200)
CO2 SERPL-SCNC: 25.6 MMOL/L (ref 22–29)
CREAT SERPL-MCNC: 0.78 MG/DL (ref 0.57–1)
DEPRECATED RDW RBC AUTO: 39.5 FL (ref 37–54)
EGFRCR SERPLBLD CKD-EPI 2021: 92.1 ML/MIN/1.73
EOSINOPHIL # BLD AUTO: 0.25 10*3/MM3 (ref 0–0.4)
EOSINOPHIL NFR BLD AUTO: 3.5 % (ref 0.3–6.2)
ERYTHROCYTE [DISTWIDTH] IN BLOOD BY AUTOMATED COUNT: 13 % (ref 12.3–15.4)
GLOBULIN UR ELPH-MCNC: 2.6 GM/DL
GLUCOSE SERPL-MCNC: 85 MG/DL (ref 65–99)
HCT VFR BLD AUTO: 45.5 % (ref 34–46.6)
HCV AB SER DONR QL: NORMAL
HDLC SERPL-MCNC: 39 MG/DL (ref 40–60)
HGB BLD-MCNC: 15.6 G/DL (ref 12–15.9)
IMM GRANULOCYTES # BLD AUTO: 0.04 10*3/MM3 (ref 0–0.05)
IMM GRANULOCYTES NFR BLD AUTO: 0.6 % (ref 0–0.5)
LDLC SERPL CALC-MCNC: 171 MG/DL (ref 0–100)
LDLC/HDLC SERPL: 4.39 {RATIO}
LYMPHOCYTES # BLD AUTO: 2.06 10*3/MM3 (ref 0.7–3.1)
LYMPHOCYTES NFR BLD AUTO: 28.5 % (ref 19.6–45.3)
MCH RBC QN AUTO: 29.5 PG (ref 26.6–33)
MCHC RBC AUTO-ENTMCNC: 34.3 G/DL (ref 31.5–35.7)
MCV RBC AUTO: 86 FL (ref 79–97)
MONOCYTES # BLD AUTO: 0.53 10*3/MM3 (ref 0.1–0.9)
MONOCYTES NFR BLD AUTO: 7.3 % (ref 5–12)
NEUTROPHILS NFR BLD AUTO: 4.3 10*3/MM3 (ref 1.7–7)
NEUTROPHILS NFR BLD AUTO: 59.4 % (ref 42.7–76)
NRBC BLD AUTO-RTO: 0 /100 WBC (ref 0–0.2)
PLATELET # BLD AUTO: 223 10*3/MM3 (ref 140–450)
PMV BLD AUTO: 11 FL (ref 6–12)
POTASSIUM SERPL-SCNC: 4 MMOL/L (ref 3.5–5.2)
PROT SERPL-MCNC: 7.2 G/DL (ref 6–8.5)
RBC # BLD AUTO: 5.29 10*6/MM3 (ref 3.77–5.28)
SODIUM SERPL-SCNC: 137 MMOL/L (ref 136–145)
TRIGL SERPL-MCNC: 523 MG/DL (ref 0–150)
TSH SERPL DL<=0.05 MIU/L-ACNC: 1.59 UIU/ML (ref 0.27–4.2)
VLDLC SERPL-MCNC: 105 MG/DL (ref 5–40)
WBC NRBC COR # BLD: 7.23 10*3/MM3 (ref 3.4–10.8)

## 2023-08-02 PROCEDURE — 86803 HEPATITIS C AB TEST: CPT

## 2023-08-02 PROCEDURE — 36415 COLL VENOUS BLD VENIPUNCTURE: CPT

## 2023-08-02 PROCEDURE — 80050 GENERAL HEALTH PANEL: CPT

## 2023-08-02 PROCEDURE — 80061 LIPID PANEL: CPT

## 2023-08-02 PROCEDURE — 82306 VITAMIN D 25 HYDROXY: CPT

## 2023-08-07 LAB
CYTOLOGIST CVX/VAG CYTO: NORMAL
CYTOLOGY CVX/VAG DOC CYTO: NORMAL
CYTOLOGY CVX/VAG DOC THIN PREP: NORMAL
DX ICD CODE: NORMAL
HIV 1 & 2 AB SER-IMP: NORMAL
HPV GENOTYPE REFLEX: NORMAL
HPV I/H RISK 4 DNA CVX QL PROBE+SIG AMP: NEGATIVE
OTHER STN SPEC: NORMAL
STAT OF ADQ CVX/VAG CYTO-IMP: NORMAL

## 2023-08-08 DIAGNOSIS — E78.2 MIXED HYPERLIPIDEMIA: Primary | ICD-10-CM

## 2023-08-08 RX ORDER — ROSUVASTATIN CALCIUM 10 MG/1
10 TABLET, COATED ORAL DAILY
Qty: 90 TABLET | Refills: 1 | Status: SHIPPED | OUTPATIENT
Start: 2023-08-08

## 2023-09-05 ENCOUNTER — TELEPHONE (OUTPATIENT)
Dept: FAMILY MEDICINE CLINIC | Facility: CLINIC | Age: 53
End: 2023-09-05

## 2023-09-05 DIAGNOSIS — E78.2 MIXED HYPERLIPIDEMIA: Primary | ICD-10-CM

## 2023-09-05 DIAGNOSIS — N95.1 MENOPAUSAL VASOMOTOR SYNDROME: ICD-10-CM

## 2023-09-05 DIAGNOSIS — Z13.220 LIPID SCREENING: ICD-10-CM

## 2023-09-05 DIAGNOSIS — N95.1 MENOPAUSAL SYMPTOM: ICD-10-CM

## 2023-09-05 NOTE — TELEPHONE ENCOUNTER
Patient has questions regarding active labs. Stated Consuelo told her to get them so far out but couldn't remember if she said 30 days, 60 days, etc etc. Please call back at your convenience.

## 2023-09-06 NOTE — TELEPHONE ENCOUNTER
Yes, patient can have all labs completed in November.  Please change the date so that all lab orders reflect the same date in November.

## 2023-09-07 NOTE — TELEPHONE ENCOUNTER
I'm unsure how to change the expected date after orders have been signed so I placed all new ones dated for 11/01/23     Please send task to me so I can notify pt and cancel the old orders.

## 2023-09-08 NOTE — TELEPHONE ENCOUNTER
Caller: Loreto Schumacher    Relationship to patient: Self    Best call back number: [phone]    Patient is needing: PATIENT CALLING TO CHECK ON THE STATUS OF HER BLOOD WORK ORDERS AND WHEN SHE IS NEEDING TO GET THEM COMPLETED.

## 2023-10-12 ENCOUNTER — TRANSCRIBE ORDERS (OUTPATIENT)
Dept: LAB | Facility: HOSPITAL | Age: 53
End: 2023-10-12
Payer: COMMERCIAL

## 2023-10-12 DIAGNOSIS — I10 ESSENTIAL HYPERTENSION, MALIGNANT: ICD-10-CM

## 2023-10-12 DIAGNOSIS — E11.00 TYPE II DIABETES MELLITUS WITH HYPEROSMOLARITY, UNCONTROLLED: Primary | ICD-10-CM

## 2023-10-12 DIAGNOSIS — D64.9 ANEMIA, UNSPECIFIED TYPE: ICD-10-CM

## 2023-10-12 DIAGNOSIS — E11.65 TYPE II DIABETES MELLITUS WITH HYPEROSMOLARITY, UNCONTROLLED: Primary | ICD-10-CM

## 2023-10-12 DIAGNOSIS — E78.2 MIXED HYPERLIPIDEMIA: ICD-10-CM

## 2023-10-12 DIAGNOSIS — E03.9 HYPOTHYROIDISM, UNSPECIFIED TYPE: ICD-10-CM

## 2023-10-13 ENCOUNTER — LAB (OUTPATIENT)
Dept: LAB | Facility: HOSPITAL | Age: 53
End: 2023-10-13
Payer: COMMERCIAL

## 2023-10-13 DIAGNOSIS — E11.65 TYPE II DIABETES MELLITUS WITH HYPEROSMOLARITY, UNCONTROLLED: ICD-10-CM

## 2023-10-13 DIAGNOSIS — E03.9 HYPOTHYROIDISM, UNSPECIFIED TYPE: ICD-10-CM

## 2023-10-13 DIAGNOSIS — E11.00 TYPE II DIABETES MELLITUS WITH HYPEROSMOLARITY, UNCONTROLLED: ICD-10-CM

## 2023-10-13 DIAGNOSIS — D64.9 ANEMIA, UNSPECIFIED TYPE: ICD-10-CM

## 2023-10-13 DIAGNOSIS — E78.2 MIXED HYPERLIPIDEMIA: ICD-10-CM

## 2023-10-13 DIAGNOSIS — I10 ESSENTIAL HYPERTENSION, MALIGNANT: ICD-10-CM

## 2023-10-13 LAB
ALBUMIN SERPL-MCNC: 4.7 G/DL (ref 3.5–5.2)
ALBUMIN UR-MCNC: <1.2 MG/DL
ALBUMIN/GLOB SERPL: 1.8 G/DL
ALP SERPL-CCNC: 70 U/L (ref 39–117)
ALT SERPL W P-5'-P-CCNC: 37 U/L (ref 1–33)
ANION GAP SERPL CALCULATED.3IONS-SCNC: 11.4 MMOL/L (ref 5–15)
AST SERPL-CCNC: 34 U/L (ref 1–32)
BASOPHILS # BLD AUTO: 0.05 10*3/MM3 (ref 0–0.2)
BASOPHILS NFR BLD AUTO: 0.8 % (ref 0–1.5)
BILIRUB SERPL-MCNC: 0.9 MG/DL (ref 0–1.2)
BUN SERPL-MCNC: 11 MG/DL (ref 6–20)
BUN/CREAT SERPL: 13.9 (ref 7–25)
CALCIUM SPEC-SCNC: 9.8 MG/DL (ref 8.6–10.5)
CHLORIDE SERPL-SCNC: 104 MMOL/L (ref 98–107)
CHOLEST SERPL-MCNC: 203 MG/DL (ref 0–200)
CO2 SERPL-SCNC: 25.6 MMOL/L (ref 22–29)
CORTIS AM PEAK SERPL-MCNC: 12.89 MCG/DL (ref 6.02–18.4)
CREAT SERPL-MCNC: 0.79 MG/DL (ref 0.57–1)
CREAT UR-MCNC: 131.3 MG/DL
CRP SERPL-MCNC: <0.3 MG/DL (ref 0–0.5)
DEPRECATED RDW RBC AUTO: 41 FL (ref 37–54)
EGFRCR SERPLBLD CKD-EPI 2021: 90.1 ML/MIN/1.73
EOSINOPHIL # BLD AUTO: 0.25 10*3/MM3 (ref 0–0.4)
EOSINOPHIL NFR BLD AUTO: 4.1 % (ref 0.3–6.2)
ERYTHROCYTE [DISTWIDTH] IN BLOOD BY AUTOMATED COUNT: 13 % (ref 12.3–15.4)
ERYTHROCYTE [SEDIMENTATION RATE] IN BLOOD: 5 MM/HR (ref 0–30)
ESTRADIOL SERPL HS-MCNC: 56.1 PG/ML
FERRITIN SERPL-MCNC: 393 NG/ML (ref 13–150)
FOLATE SERPL-MCNC: >20 NG/ML (ref 4.78–24.2)
GLOBULIN UR ELPH-MCNC: 2.6 GM/DL
GLUCOSE SERPL-MCNC: 104 MG/DL (ref 65–99)
HBA1C MFR BLD: 5.6 % (ref 4.8–5.6)
HCT VFR BLD AUTO: 43.8 % (ref 34–46.6)
HDLC SERPL-MCNC: 41 MG/DL (ref 40–60)
HGB BLD-MCNC: 15.2 G/DL (ref 12–15.9)
IMM GRANULOCYTES # BLD AUTO: 0.03 10*3/MM3 (ref 0–0.05)
IMM GRANULOCYTES NFR BLD AUTO: 0.5 % (ref 0–0.5)
IRON 24H UR-MRATE: 104 MCG/DL (ref 37–145)
IRON SATN MFR SERPL: 26 % (ref 20–50)
LDLC SERPL CALC-MCNC: 114 MG/DL (ref 0–100)
LDLC/HDLC SERPL: 2.61 {RATIO}
LYMPHOCYTES # BLD AUTO: 2.2 10*3/MM3 (ref 0.7–3.1)
LYMPHOCYTES NFR BLD AUTO: 35.7 % (ref 19.6–45.3)
MCH RBC QN AUTO: 30.2 PG (ref 26.6–33)
MCHC RBC AUTO-ENTMCNC: 34.7 G/DL (ref 31.5–35.7)
MCV RBC AUTO: 87.1 FL (ref 79–97)
MONOCYTES # BLD AUTO: 0.38 10*3/MM3 (ref 0.1–0.9)
MONOCYTES NFR BLD AUTO: 6.2 % (ref 5–12)
NEUTROPHILS NFR BLD AUTO: 3.26 10*3/MM3 (ref 1.7–7)
NEUTROPHILS NFR BLD AUTO: 52.7 % (ref 42.7–76)
NRBC BLD AUTO-RTO: 0 /100 WBC (ref 0–0.2)
PLATELET # BLD AUTO: 206 10*3/MM3 (ref 140–450)
PMV BLD AUTO: 10.7 FL (ref 6–12)
POTASSIUM SERPL-SCNC: 4 MMOL/L (ref 3.5–5.2)
PROGEST SERPL-MCNC: 0.17 NG/ML
PROLACTIN SERPL-MCNC: 8.49 NG/ML (ref 4.79–23.3)
PROT SERPL-MCNC: 7.3 G/DL (ref 6–8.5)
RBC # BLD AUTO: 5.03 10*6/MM3 (ref 3.77–5.28)
SODIUM SERPL-SCNC: 141 MMOL/L (ref 136–145)
T3FREE SERPL-MCNC: 2.95 PG/ML (ref 2–4.4)
T4 FREE SERPL-MCNC: 0.92 NG/DL (ref 0.93–1.7)
TIBC SERPL-MCNC: 404 MCG/DL (ref 298–536)
TRANSFERRIN SERPL-MCNC: 271 MG/DL (ref 200–360)
TRIGL SERPL-MCNC: 275 MG/DL (ref 0–150)
TSH SERPL DL<=0.05 MIU/L-ACNC: 1.52 UIU/ML (ref 0.27–4.2)
VIT B12 BLD-MCNC: 562 PG/ML (ref 211–946)
VLDLC SERPL-MCNC: 48 MG/DL (ref 5–40)
WBC NRBC COR # BLD: 6.17 10*3/MM3 (ref 3.4–10.8)

## 2023-10-13 PROCEDURE — 86003 ALLG SPEC IGE CRUDE XTRC EA: CPT

## 2023-10-13 PROCEDURE — 84681 ASSAY OF C-PEPTIDE: CPT

## 2023-10-13 PROCEDURE — 84466 ASSAY OF TRANSFERRIN: CPT

## 2023-10-13 PROCEDURE — 86001 ALLERGEN SPECIFIC IGG: CPT

## 2023-10-13 PROCEDURE — 84482 T3 REVERSE: CPT

## 2023-10-13 PROCEDURE — 84146 ASSAY OF PROLACTIN: CPT

## 2023-10-13 PROCEDURE — 84481 FREE ASSAY (FT-3): CPT

## 2023-10-13 PROCEDURE — 84144 ASSAY OF PROGESTERONE: CPT

## 2023-10-13 PROCEDURE — 84410 TESTOSTERONE BIOAVAILABLE: CPT

## 2023-10-13 PROCEDURE — 82043 UR ALBUMIN QUANTITATIVE: CPT

## 2023-10-13 PROCEDURE — 81382 HLA II TYPING 1 LOC HR: CPT

## 2023-10-13 PROCEDURE — 36415 COLL VENOUS BLD VENIPUNCTURE: CPT

## 2023-10-13 PROCEDURE — 83036 HEMOGLOBIN GLYCOSYLATED A1C: CPT

## 2023-10-13 PROCEDURE — 84439 ASSAY OF FREE THYROXINE: CPT

## 2023-10-13 PROCEDURE — 86140 C-REACTIVE PROTEIN: CPT

## 2023-10-13 PROCEDURE — 80061 LIPID PANEL: CPT

## 2023-10-13 PROCEDURE — 84402 ASSAY OF FREE TESTOSTERONE: CPT

## 2023-10-13 PROCEDURE — 83525 ASSAY OF INSULIN: CPT

## 2023-10-13 PROCEDURE — 85652 RBC SED RATE AUTOMATED: CPT

## 2023-10-13 PROCEDURE — 82570 ASSAY OF URINE CREATININE: CPT

## 2023-10-13 PROCEDURE — 83540 ASSAY OF IRON: CPT

## 2023-10-13 PROCEDURE — 82746 ASSAY OF FOLIC ACID SERUM: CPT

## 2023-10-13 PROCEDURE — 82670 ASSAY OF TOTAL ESTRADIOL: CPT

## 2023-10-13 PROCEDURE — 80050 GENERAL HEALTH PANEL: CPT

## 2023-10-13 PROCEDURE — 82728 ASSAY OF FERRITIN: CPT

## 2023-10-13 PROCEDURE — 86364 TISS TRNSGLTMNASE EA IG CLAS: CPT

## 2023-10-13 PROCEDURE — 82024 ASSAY OF ACTH: CPT

## 2023-10-13 PROCEDURE — 82533 TOTAL CORTISOL: CPT

## 2023-10-13 PROCEDURE — 84403 ASSAY OF TOTAL TESTOSTERONE: CPT

## 2023-10-13 PROCEDURE — 84305 ASSAY OF SOMATOMEDIN: CPT

## 2023-10-13 PROCEDURE — 82607 VITAMIN B-12: CPT

## 2023-10-13 PROCEDURE — 83003 ASSAY GROWTH HORMONE (HGH): CPT

## 2023-10-14 LAB
ACTH PLAS-MCNC: 27.5 PG/ML (ref 7.2–63.3)
C PEPTIDE SERPL-MCNC: 4.9 NG/ML (ref 1.1–4.4)
INSULIN SERPL-ACNC: 19.7 UIU/ML (ref 2.6–24.9)
TTG IGG SER-ACNC: <2 U/ML (ref 0–5)

## 2023-10-15 LAB — GLUTEN IGG-MCNC: 19.9 UG/ML (ref 0–1.9)

## 2023-10-16 LAB
GH SERPL-MCNC: 0.2 NG/ML (ref 0–10)
IGF-I SERPL-MCNC: 150 NG/ML (ref 65–216)

## 2023-10-18 LAB
GLUTEN IGE QN: 0.21 KU/L
T3REVERSE SERPL-MCNC: 17.9 NG/DL (ref 9.2–24.1)

## 2023-10-20 LAB
ANNOTATION COMMENT IMP: NORMAL
HLA-DQ8 QL: NEGATIVE
REF LAB TEST METHOD: NORMAL
TESTOST FREE SERPL-MCNC: 0.8 PG/ML (ref 0–4.2)
TESTOST SERPL-MCNC: 11.1 NG/DL

## 2023-10-23 LAB
TESTOST SERPL-MCNC: 9.4 NG/DL
TESTOSTERONE.FREE+WB MFR SERPL: 70.9 %
TESTOSTERONE.FREE+WB SERPL-MCNC: 6.7 NG/DL

## 2024-01-16 ENCOUNTER — LAB (OUTPATIENT)
Dept: LAB | Facility: HOSPITAL | Age: 54
End: 2024-01-16
Payer: COMMERCIAL

## 2024-01-16 DIAGNOSIS — Z13.220 LIPID SCREENING: ICD-10-CM

## 2024-01-16 DIAGNOSIS — E78.2 MIXED HYPERLIPIDEMIA: ICD-10-CM

## 2024-01-16 DIAGNOSIS — N95.1 MENOPAUSAL VASOMOTOR SYNDROME: ICD-10-CM

## 2024-01-16 LAB
ALBUMIN SERPL-MCNC: 4.6 G/DL (ref 3.5–5.2)
ALBUMIN/GLOB SERPL: 1.7 G/DL
ALP SERPL-CCNC: 85 U/L (ref 39–117)
ALT SERPL W P-5'-P-CCNC: 23 U/L (ref 1–33)
ANION GAP SERPL CALCULATED.3IONS-SCNC: 11.1 MMOL/L (ref 5–15)
AST SERPL-CCNC: 22 U/L (ref 1–32)
BILIRUB SERPL-MCNC: 0.5 MG/DL (ref 0–1.2)
BUN SERPL-MCNC: 10 MG/DL (ref 6–20)
BUN/CREAT SERPL: 12.7 (ref 7–25)
CALCIUM SPEC-SCNC: 9.5 MG/DL (ref 8.6–10.5)
CHLORIDE SERPL-SCNC: 105 MMOL/L (ref 98–107)
CHOLEST SERPL-MCNC: 190 MG/DL (ref 0–200)
CO2 SERPL-SCNC: 23.9 MMOL/L (ref 22–29)
CREAT SERPL-MCNC: 0.79 MG/DL (ref 0.57–1)
EGFRCR SERPLBLD CKD-EPI 2021: 90.1 ML/MIN/1.73
FSH SERPL-ACNC: 15.3 MIU/ML
GLOBULIN UR ELPH-MCNC: 2.7 GM/DL
GLUCOSE SERPL-MCNC: 104 MG/DL (ref 65–99)
HDLC SERPL QL: 4.42
HDLC SERPL-MCNC: 43 MG/DL (ref 40–60)
LDLC SERPL CALC-MCNC: 114 MG/DL (ref 0–100)
LH SERPL-ACNC: 28.3 MIU/ML
POTASSIUM SERPL-SCNC: 4 MMOL/L (ref 3.5–5.2)
PROGEST SERPL-MCNC: 0.36 NG/ML
PROT SERPL-MCNC: 7.3 G/DL (ref 6–8.5)
SODIUM SERPL-SCNC: 140 MMOL/L (ref 136–145)
TRIGL SERPL-MCNC: 191 MG/DL (ref 0–150)
VLDLC SERPL-MCNC: 33 MG/DL (ref 5–40)

## 2024-01-16 PROCEDURE — 83002 ASSAY OF GONADOTROPIN (LH): CPT

## 2024-01-16 PROCEDURE — 36415 COLL VENOUS BLD VENIPUNCTURE: CPT

## 2024-01-16 PROCEDURE — 82672 ASSAY OF ESTROGEN: CPT

## 2024-01-16 PROCEDURE — 80061 LIPID PANEL: CPT

## 2024-01-16 PROCEDURE — 84144 ASSAY OF PROGESTERONE: CPT

## 2024-01-16 PROCEDURE — 83001 ASSAY OF GONADOTROPIN (FSH): CPT

## 2024-01-16 PROCEDURE — 80053 COMPREHEN METABOLIC PANEL: CPT

## 2024-01-18 ENCOUNTER — OFFICE VISIT (OUTPATIENT)
Dept: SLEEP MEDICINE | Facility: HOSPITAL | Age: 54
End: 2024-01-18
Payer: COMMERCIAL

## 2024-01-18 VITALS — HEART RATE: 68 BPM | OXYGEN SATURATION: 96 % | BODY MASS INDEX: 33.27 KG/M2 | HEIGHT: 65 IN | WEIGHT: 199.7 LBS

## 2024-01-18 DIAGNOSIS — G47.33 OBSTRUCTIVE SLEEP APNEA: Primary | ICD-10-CM

## 2024-01-18 DIAGNOSIS — M25.552 CHRONIC PAIN OF BOTH HIPS: ICD-10-CM

## 2024-01-18 DIAGNOSIS — M25.551 CHRONIC PAIN OF BOTH HIPS: ICD-10-CM

## 2024-01-18 DIAGNOSIS — E66.9 OBESITY (BMI 30-39.9): ICD-10-CM

## 2024-01-18 DIAGNOSIS — G89.29 CHRONIC PAIN OF BOTH HIPS: ICD-10-CM

## 2024-01-18 PROCEDURE — G0463 HOSPITAL OUTPT CLINIC VISIT: HCPCS

## 2024-01-18 NOTE — PROGRESS NOTES
[facility] New Patient/Consultation       Reason for Consultation: LOYD      Patient Care Team:  Consuelo Arteaga APRN as PCP - General (Family Medicine)  Lázaro Ortega MD as Consulting Physician (General Surgery)  Tootie Handley MD as Consulting Physician (Sleep Medicine)      History of present illness:  Thank you for asking me to see your patient.  The patient is a 53 y.o. female with presents today to establish care for LOYD.  Reviewed copy of home sleep study from 2/13/2019 which showed AHI 9.0 events per hour lowest SpO2 89%.  I also reviewed copy of titration study from 3/15/2019 where patient was advised CPAP at set pressure of 7 cm H2O.  Today patient reports sleep latency around 30 minutes or so sleeps off and on throughout the night.  0-1 naps.  Reports waking over the past 5 years.  BMI 33.2.      Social History: No tobacco use 1 to 2 glasses of wine per year 2 caffeinated beverages a day no drug use    Allergies:  Patient has no known allergies.    Family History: LOYD yes's       Current Outpatient Medications:     rosuvastatin (Crestor) 10 MG tablet, Take 1 tablet by mouth Daily., Disp: 90 tablet, Rfl: 1    Acetylcysteine (NAC) 500 MG capsule, Take 600 mg by mouth Daily. (Patient not taking: Reported on 1/18/2024), Disp: , Rfl:     Biotin w/ Vitamins C & E (HAIR SKIN & NAILS GUMMIES PO), Take 1 each by mouth Daily. (Patient not taking: Reported on 1/18/2024), Disp: , Rfl:     CBD oil (cannabidiol) capsule, Take 0.5 capsules by mouth Every Night. (Patient not taking: Reported on 1/18/2024), Disp: , Rfl:     Cholecalciferol 125 MCG (5000 UT) tablet, Vitamin D3 125 mcg (5,000 unit) oral tablet take 1 tablet by oral route daily   Active (Patient not taking: Reported on 1/18/2024), Disp: , Rfl:     hydroxychloroquine (PLAQUENIL) 200 MG tablet, Every 7 (Seven) Days. (Patient not taking: Reported on 1/18/2024), Disp: , Rfl:     multivitamin with minerals tablet tablet, Take 1 tablet by  "mouth Daily. (Patient not taking: Reported on 1/18/2024), Disp: , Rfl:     Red Yeast Rice Extract 600 MG tablet, red yeast rice 600 mg oral tablet take 1 tablet by oral route daily for 90 days   Active (Patient not taking: Reported on 1/18/2024), Disp: , Rfl:     vitamin C (ASCORBIC ACID) 250 MG tablet, Take 4 tablets by mouth Daily. (Patient not taking: Reported on 1/18/2024), Disp: , Rfl:     Vitamin E 180 MG (400 UNIT) capsule capsule, Take 1 capsule by mouth Daily. (Patient not taking: Reported on 1/18/2024), Disp: , Rfl:     Zinc 50 MG capsule, zinc 50 mg oral tablet take 1 tablet by oral route daily   Active (Patient not taking: Reported on 1/18/2024), Disp: , Rfl:     Vital Signs:    Vitals:    01/18/24 0700   Pulse: 68   SpO2: 96%   Weight: 90.6 kg (199 lb 11.2 oz)   Height: 165.1 cm (65\")      Body mass index is 33.23 kg/m².  Neck Circumference: 15 inches      REVIEW OF SYSTEMS.  Full review of systems available on the intake form which is scanned in the media tab.  The relevant positive are noted below  Daytime excessive sleepiness with Sherman Sleepiness Scale :Total score: 4   Snoring  All negative      Physical exam:  Vitals:    01/18/24 0700   Pulse: 68   SpO2: 96%   Weight: 90.6 kg (199 lb 11.2 oz)   Height: 165.1 cm (65\")    Body mass index is 33.23 kg/m². Neck Circumference: 15 inches  HEENT: Head is atraumatic, normocephalic  Eyes: pupils are round equal and reacting to light and accommodation, conjunctiva normal  Throat: tongue normal  NECK:Neck Circumference: 15 inches  RESPIRATORY SYSTEM: Regular respirations  CARDIOVASULAR SYSTEM: Regular rate  EXTREMITES: No cyanosis, clubbing  NEUROLOGICAL SYSTEM: Oriented x 3, no gross motor defects, gait normal      Impression:  1. Obstructive sleep apnea    2. Obesity (BMI 30-39.9)    3. Chronic pain of both hips        Plan:    Good sleep hygiene measures should be maintained.  Weight loss would be beneficial in this patient who is obese BMI 33.2.    I " discussed the pathophysiology of obstructive sleep apnea with the patient.  We discussed the adverse outcomes associated with untreated sleep-disordered breathing. Weight loss will be strongly beneficial in order to reduce the severity of sleep-disordered breathing. Caution during activities that require prolonged concentration is strongly advised.     Will addend note once we have data; patient forgot her card today.  Subjectively reports PAP continues to significantly help her sleep quality.  Patient does not need order for supplies from supply company; she prefers to continue to get them online herself.    Advised to talk to PCP about better controlling hip pain which disrupts her sleep in the middle of the night.    Patient uses the CPAP device and benefits from its use in terms of reduction of hypersomnia and snoring.Weight loss will be strongly beneficial to reduce the severity of sleep-disordered breathing.  Caution during activities that require prolonged concentration is strongly advised if sleepiness returns. Changing of PAP supplies regularly is important for effective use. Patient needs to change cushion on the mask or plugs on nasal pillows along with disposable filters once every month and change mask frame, tubing, headgear and Velcro straps every 6 months at the minimum.    Thank you for allowing me to participate in your patient's care.    Tootie Handley MD  Sleep Medicine  01/18/24  09:15 EST

## 2024-01-20 LAB — ESTROGEN SERPL-MCNC: 366 PG/ML

## 2024-01-25 ENCOUNTER — HOSPITAL ENCOUNTER (OUTPATIENT)
Dept: MAMMOGRAPHY | Facility: HOSPITAL | Age: 54
Discharge: HOME | End: 2024-01-25
Admitting: NURSE PRACTITIONER
Payer: COMMERCIAL

## 2024-01-25 DIAGNOSIS — Z12.31 SCREENING MAMMOGRAM, ENCOUNTER FOR: ICD-10-CM

## 2024-01-25 PROCEDURE — 77063 BREAST TOMOSYNTHESIS BI: CPT

## 2024-01-25 PROCEDURE — 77067 SCR MAMMO BI INCL CAD: CPT

## 2024-09-10 ENCOUNTER — TELEPHONE (OUTPATIENT)
Dept: FAMILY MEDICINE CLINIC | Facility: CLINIC | Age: 54
End: 2024-09-10
Payer: COMMERCIAL

## 2024-09-10 DIAGNOSIS — L98.9 SKIN LESIONS: Primary | ICD-10-CM

## 2025-01-16 ENCOUNTER — OFFICE VISIT (OUTPATIENT)
Dept: SLEEP MEDICINE | Facility: HOSPITAL | Age: 55
End: 2025-01-16
Payer: COMMERCIAL

## 2025-01-16 VITALS — WEIGHT: 208 LBS | OXYGEN SATURATION: 98 % | BODY MASS INDEX: 34.66 KG/M2 | HEIGHT: 65 IN | HEART RATE: 85 BPM

## 2025-01-16 DIAGNOSIS — E66.9 OBESITY (BMI 30-39.9): ICD-10-CM

## 2025-01-16 DIAGNOSIS — G47.33 OBSTRUCTIVE SLEEP APNEA: Primary | ICD-10-CM

## 2025-01-16 PROCEDURE — 99213 OFFICE O/P EST LOW 20 MIN: CPT | Performed by: FAMILY MEDICINE

## 2025-01-16 PROCEDURE — G0463 HOSPITAL OUTPT CLINIC VISIT: HCPCS

## 2025-01-16 NOTE — PROGRESS NOTES
"Follow Up Sleep Disorders Center Note     Chief Complaint:  LOYD     Primary Care Physician: Consuelo Arteaga APRN    Interval History:   The patient is a 54 y.o. female  who was last seen in the sleep lab: 1/18/2024.  Baseline AHI 2019 showed AHI of 9 lowest SpO2 89%.  Titration study advised set pressure 7 cm H2O.  Data at last visit showed average use of 6 hours 38 minutes set pressure of 5.5 and average AHI of 0.3.  Patient felt like she was not getting enough air therefore we change pressure to 6/12 cm H2O and advised her to continue care per primary care in terms of chronic pain which was disrupting her sleep.    Downloaded PAP Data Reviewed For Compliance:  DME is [employer].  Downloads between 12/17/2024 - 1/15/2025.  Average usage is 4 hours 6 minutes.  Average AHI is 0.6.  Average auto CPAP pressure is 5.5 cm H2O    I have reviewed the above results and compared them with the patient's last downloads and reviewed with the patient.    Review of Systems:    A complete review of systems was done and all were negative with the exception of dry mouth    Social History:    Social History     Socioeconomic History    Marital status:    Tobacco Use    Smoking status: Never    Smokeless tobacco: Never    Tobacco comments:     I do once in a great while smoke, but do not inhale   Vaping Use    Vaping status: Never Used   Substance and Sexual Activity    Alcohol use: Not Currently     Comment: May drink 1 pina colada a year    Drug use: Never    Sexual activity: Yes     Partners: Male     Birth control/protection: Other     Comment: Partial hysterectomy       Allergies:  Patient has no known allergies.     Medication Review:  Reviewed.      Vital Signs:    Vitals:    01/16/25 0900   Pulse: 85   SpO2: 98%   Weight: 94.3 kg (208 lb)   Height: 165.1 cm (65\")     Body mass index is 34.61 kg/m².    Physical Exam:    Constitutional:  Well developed 54 y.o. female that appears in no apparent distress.  Awake & " oriented times 3.  Normal mood with normal recent and remote memory and normal judgement.  Eyes:  Conjunctivae normal.  Oropharynx: Previously, moist mucous membranes.    Self-administered Sherborn Sleepiness Scale test results: 3  0-5 Lower normal daytime sleepiness  6-10 Higher normal daytime sleepiness  11-12 Mild, 13-15 Moderate, & 16-24 Severe excessive daytime sleepiness    Impression:   1. Obstructive sleep apnea    2. Obesity (BMI 30-39.9)        Obstructive sleep apnea adequately treated with CPAP 5.5 cm H2O.  Pressure change which was ordered at last visit was never done.  But happy with settings.  Is very motivated to lose weight to see if she can resolve her mild LOYD.    Plan:  Good sleep hygiene measures should be maintained.  Weight loss would be beneficial in this patient who is obese by Body mass index is 34.61 kg/m²..      After evaluating the patient and assessing results available, the patient is benefiting from the treatment being provided.     The patient will continue CPAP.  After clinical evaluation and review of downloads, I recommend no changes to the patient's pressures.  A new prescription will be sent to the patient's DME.    Caution during activities that require prolonged concentration is strongly advised if sleepiness returns. Changing of PAP supplies regularly is important for effective use. Patient needs to change cushion on the mask or plugs on nasal pillows along with disposable filters once every month and change mask frame, tubing, headgear and Velcro straps every 6 months at the minimum.    I answered all of the patient's questions.  The patient will call for any problems and will follow up in 1 year.      Tootie Handley MD  Sleep Medicine  01/16/25  09:25 EST

## 2025-02-27 ENCOUNTER — TRANSCRIBE ORDERS (OUTPATIENT)
Dept: ADMINISTRATIVE | Facility: HOSPITAL | Age: 55
End: 2025-02-27
Payer: COMMERCIAL

## 2025-02-27 DIAGNOSIS — Z12.31 VISIT FOR SCREENING MAMMOGRAM: Primary | ICD-10-CM

## 2025-03-17 ENCOUNTER — HOSPITAL ENCOUNTER (OUTPATIENT)
Dept: MAMMOGRAPHY | Facility: HOSPITAL | Age: 55
Discharge: HOME | End: 2025-03-17
Admitting: NURSE PRACTITIONER
Payer: COMMERCIAL

## 2025-03-17 DIAGNOSIS — Z12.31 VISIT FOR SCREENING MAMMOGRAM: ICD-10-CM

## 2025-03-17 PROCEDURE — 77063 BREAST TOMOSYNTHESIS BI: CPT

## 2025-03-17 PROCEDURE — 77067 SCR MAMMO BI INCL CAD: CPT

## 2025-03-19 ENCOUNTER — OFFICE VISIT (OUTPATIENT)
Dept: FAMILY MEDICINE CLINIC | Facility: CLINIC | Age: 55
End: 2025-03-19
Payer: COMMERCIAL

## 2025-03-19 ENCOUNTER — LAB (OUTPATIENT)
Dept: LAB | Facility: HOSPITAL | Age: 55
End: 2025-03-19
Payer: COMMERCIAL

## 2025-03-19 VITALS
HEIGHT: 65 IN | BODY MASS INDEX: 35.35 KG/M2 | OXYGEN SATURATION: 98 % | HEART RATE: 67 BPM | TEMPERATURE: 98.7 F | DIASTOLIC BLOOD PRESSURE: 67 MMHG | WEIGHT: 212.2 LBS | SYSTOLIC BLOOD PRESSURE: 139 MMHG

## 2025-03-19 DIAGNOSIS — Z00.00 ANNUAL PHYSICAL EXAM: ICD-10-CM

## 2025-03-19 DIAGNOSIS — Z00.00 ANNUAL PHYSICAL EXAM: Primary | ICD-10-CM

## 2025-03-19 DIAGNOSIS — Z82.49 FAMILY HISTORY OF EARLY CAD: ICD-10-CM

## 2025-03-19 DIAGNOSIS — R00.2 PALPITATIONS: ICD-10-CM

## 2025-03-19 DIAGNOSIS — E78.2 MIXED HYPERLIPIDEMIA: ICD-10-CM

## 2025-03-19 DIAGNOSIS — R73.09 ELEVATED GLUCOSE LEVEL: ICD-10-CM

## 2025-03-19 PROBLEM — E66.9 OBESITY (BMI 30-39.9): Status: ACTIVE | Noted: 2025-03-19

## 2025-03-19 LAB
ALBUMIN SERPL-MCNC: 4.7 G/DL (ref 3.5–5.2)
ALBUMIN/GLOB SERPL: 1.7 G/DL
ALP SERPL-CCNC: 79 U/L (ref 39–117)
ALT SERPL W P-5'-P-CCNC: 32 U/L (ref 1–33)
ANION GAP SERPL CALCULATED.3IONS-SCNC: 12 MMOL/L (ref 5–15)
AST SERPL-CCNC: 32 U/L (ref 1–32)
BASOPHILS # BLD AUTO: 0.08 10*3/MM3 (ref 0–0.2)
BASOPHILS NFR BLD AUTO: 1 % (ref 0–1.5)
BILIRUB SERPL-MCNC: 0.7 MG/DL (ref 0–1.2)
BUN SERPL-MCNC: 12 MG/DL (ref 6–20)
BUN/CREAT SERPL: 17.9 (ref 7–25)
CALCIUM SPEC-SCNC: 9.6 MG/DL (ref 8.6–10.5)
CHLORIDE SERPL-SCNC: 104 MMOL/L (ref 98–107)
CHOLEST SERPL-MCNC: 355 MG/DL (ref 0–200)
CO2 SERPL-SCNC: 23 MMOL/L (ref 22–29)
CREAT SERPL-MCNC: 0.67 MG/DL (ref 0.57–1)
DEPRECATED RDW RBC AUTO: 43.5 FL (ref 37–54)
EGFRCR SERPLBLD CKD-EPI 2021: 104.7 ML/MIN/1.73
EOSINOPHIL # BLD AUTO: 0.32 10*3/MM3 (ref 0–0.4)
EOSINOPHIL NFR BLD AUTO: 4 % (ref 0.3–6.2)
ERYTHROCYTE [DISTWIDTH] IN BLOOD BY AUTOMATED COUNT: 13.2 % (ref 12.3–15.4)
GLOBULIN UR ELPH-MCNC: 2.7 GM/DL
GLUCOSE SERPL-MCNC: 98 MG/DL (ref 65–99)
HBA1C MFR BLD: 5.9 % (ref 4.8–5.6)
HCT VFR BLD AUTO: 48.6 % (ref 34–46.6)
HDLC SERPL-MCNC: 41 MG/DL (ref 40–60)
HGB BLD-MCNC: 16.1 G/DL (ref 12–15.9)
IMM GRANULOCYTES # BLD AUTO: 0.07 10*3/MM3 (ref 0–0.05)
IMM GRANULOCYTES NFR BLD AUTO: 0.9 % (ref 0–0.5)
LDLC SERPL CALC-MCNC: 227 MG/DL (ref 0–100)
LDLC/HDLC SERPL: 5.7 {RATIO}
LYMPHOCYTES # BLD AUTO: 2.85 10*3/MM3 (ref 0.7–3.1)
LYMPHOCYTES NFR BLD AUTO: 35.6 % (ref 19.6–45.3)
MCH RBC QN AUTO: 29.2 PG (ref 26.6–33)
MCHC RBC AUTO-ENTMCNC: 33.1 G/DL (ref 31.5–35.7)
MCV RBC AUTO: 88.2 FL (ref 79–97)
MONOCYTES # BLD AUTO: 0.59 10*3/MM3 (ref 0.1–0.9)
MONOCYTES NFR BLD AUTO: 7.4 % (ref 5–12)
NEUTROPHILS NFR BLD AUTO: 4.09 10*3/MM3 (ref 1.7–7)
NEUTROPHILS NFR BLD AUTO: 51.1 % (ref 42.7–76)
NRBC BLD AUTO-RTO: 0 /100 WBC (ref 0–0.2)
PLATELET # BLD AUTO: 225 10*3/MM3 (ref 140–450)
PMV BLD AUTO: 10.9 FL (ref 6–12)
POTASSIUM SERPL-SCNC: 4.6 MMOL/L (ref 3.5–5.2)
PROT SERPL-MCNC: 7.4 G/DL (ref 6–8.5)
RBC # BLD AUTO: 5.51 10*6/MM3 (ref 3.77–5.28)
SODIUM SERPL-SCNC: 139 MMOL/L (ref 136–145)
TRIGL SERPL-MCNC: 401 MG/DL (ref 0–150)
TSH SERPL DL<=0.05 MIU/L-ACNC: 1.71 UIU/ML (ref 0.27–4.2)
VLDLC SERPL-MCNC: 87 MG/DL (ref 5–40)
WBC NRBC COR # BLD AUTO: 8 10*3/MM3 (ref 3.4–10.8)

## 2025-03-19 PROCEDURE — 99396 PREV VISIT EST AGE 40-64: CPT | Performed by: NURSE PRACTITIONER

## 2025-03-19 PROCEDURE — 36415 COLL VENOUS BLD VENIPUNCTURE: CPT

## 2025-03-19 PROCEDURE — 83036 HEMOGLOBIN GLYCOSYLATED A1C: CPT

## 2025-03-19 PROCEDURE — 80050 GENERAL HEALTH PANEL: CPT

## 2025-03-19 PROCEDURE — 80061 LIPID PANEL: CPT

## 2025-03-19 NOTE — ASSESSMENT & PLAN NOTE
Patient's (Body mass index is 35.31 kg/m².) indicates that they are obese (BMI >30) with health conditions that include dyslipidemias . Weight is unchanged. BMI  is above average; BMI management plan is completed. We discussed portion control and increasing exercise.

## 2025-03-19 NOTE — PROGRESS NOTES
"Chief Complaint  Annual physical exam  Arterial Calcifications (Incidental finding on Mammogram)    SUBJECTIVE  Loreto Schumacher presents to [facility]    Annual physical exam    Patient presents to discuss incidental finding on 3/17/25 mammogram of Arterial Calcifications.  Family history-mother and maternal grandfather with heart disease and bypass and stents.  Patient states she is asymptomatic, however with her extensive history she wishes to proceed with further testing.    History of Present Illness  Past Medical History:   Diagnosis Date    Hyperlipidemia       Family History   Problem Relation Age of Onset    Hyperlipidemia Mother     Heart disease Mother     Depression Mother     Hypertension Mother     Sleep apnea Mother     Diabetes Mother     Cancer Father     Diabetes Father     Alcohol abuse Father     COPD Father     Depression Father     Hearing loss Father     Sleep apnea Father     Sleep apnea Maternal Grandmother     Sleep apnea Maternal Grandfather     Sleep apnea Paternal Grandmother     Sleep apnea Paternal Grandfather     Malig Hyperthermia Neg Hx       Past Surgical History:   Procedure Laterality Date    APPENDECTOMY      COLONOSCOPY  2019    CYST REMOVAL      THIGH, ELBOW, HEAD    ENDOSCOPY N/A 12/28/2021    Procedure: ESOPHAGOGASTRODUODENOSCOPY;  Surgeon: Lázaro Ortega MD;  Location: [facility] ENDOSCOPY;  Service: General;  Laterality: N/A;  abdomina pain    HYSTERECTOMY      KIDNEY STONE SURGERY      SUBTOTAL HYSTERECTOMY  2004        Current Outpatient Medications:     hydroxychloroquine (PLAQUENIL) 200 MG tablet, Every 7 (Seven) Days., Disp: , Rfl:     rosuvastatin (Crestor) 10 MG tablet, Take 1 tablet by mouth Daily., Disp: 90 tablet, Rfl: 1    OBJECTIVE  Vital Signs:   /67 (BP Location: Left arm, Patient Position: Sitting, Cuff Size: Large Adult)   Pulse 67   Temp 98.7 °F (37.1 °C) (Temporal)   Ht 165.1 cm (65\")   Wt 96.3 kg (212 lb " "3.2 oz)   SpO2 98%   BMI 35.31 kg/m²    Estimated body mass index is 35.31 kg/m² as calculated from the following:    Height as of this encounter: 165.1 cm (65\").    Weight as of this encounter: 96.3 kg (212 lb 3.2 oz).     Wt Readings from Last 3 Encounters:   03/19/25 96.3 kg (212 lb 3.2 oz)   02/15/25 94.3 kg (208 lb)   01/16/25 94.3 kg (208 lb)     BP Readings from Last 3 Encounters:   03/19/25 139/67   02/15/25 138/85   08/02/23 134/70       Physical Exam  Vitals reviewed.   Constitutional:       Appearance: Normal appearance. She is well-developed.   HENT:      Head: Normocephalic and atraumatic.      Right Ear: External ear normal.      Left Ear: External ear normal.      Mouth/Throat:      Pharynx: No oropharyngeal exudate.   Eyes:      Conjunctiva/sclera: Conjunctivae normal.      Pupils: Pupils are equal, round, and reactive to light.   Neck:      Vascular: No carotid bruit.   Cardiovascular:      Rate and Rhythm: Normal rate and regular rhythm.      Pulses: Normal pulses.      Heart sounds: Normal heart sounds. No murmur heard.     No friction rub. No gallop.   Pulmonary:      Effort: Pulmonary effort is normal.      Breath sounds: Normal breath sounds. No wheezing or rhonchi.   Skin:     General: Skin is warm and dry.   Neurological:      Mental Status: She is alert and oriented to person, place, and time.      Cranial Nerves: No cranial nerve deficit.   Psychiatric:         Mood and Affect: Mood and affect normal.         Behavior: Behavior normal.         Thought Content: Thought content normal.         Judgment: Judgment normal.          Result Review        Mammo Screening Digital Tomosynthesis Bilateral With CAD  Result Date: 3/17/2025  1.  No mammographic evidence of malignancy.  Recommend annual screening mammography. 2.  Breast arterial calcifications which have been associated with cardiovascular disease.  BI-RADS ASSESSMENT: Category 2: Benign  Note: It has been reported that there is " approximately a 15% false negative rate in mammography.  Therefore, management of a palpable abnormality should not be deferred because of a negative mammogram.  3/17/2025 10:12 AM by Varun Jarquin on Workstation: [id number]      XR Chest 2 View  Result Date: 2/15/2025  No active disease. Electronically Signed: Hernandez Hogue MD  2/15/2025 10:47 AM EST  Workstation ID: [id number]        The above data has been reviewed by ROBYN Sommers 03/19/2025 09:33 EDT.          Patient Care Team:  Consuelo Arteaga APRN as PCP - General (Family Medicine)  Lázaro Ortega MD as Consulting Physician (General Surgery)    Class 2 Severe Obesity (BMI >=35 and <=39.9). Obesity-related health conditions include the following: dyslipidemias. Obesity is unchanged. BMI is is above average; BMI management plan is completed. We discussed portion control and increasing exercise.       ASSESSMENT & PLAN    Diagnoses and all orders for this visit:    1. Annual physical exam (Primary)  -     Comprehensive Metabolic Panel; Future  -     CBC & Differential; Future  -     Lipid Panel; Future  -     TSH Rfx On Abnormal To Free T4; Future    2. Elevated glucose level  Comments:  Check hemoglobin A1c  Orders:  -     Hemoglobin A1c; Future    3. Palpitations  -     Adult Stress Echo W/ Cont or Stress Agent if Necessary Per Protocol; Future    4. Family history of early CAD  -     CT Cardiac Calcium Score Without Dye; Future  -     Adult Stress Echo W/ Cont or Stress Agent if Necessary Per Protocol; Future    5. Mixed hyperlipidemia  Comments:  Patient not taking Crestor for couple months now, continue to hold and recheck lab     The patient is advised to continue current medications, continue current healthy lifestyle patterns, and return for routine annual checkups.      Tobacco Use: Low Risk  (3/19/2025)    Patient History     Smoking Tobacco Use: Never     Smokeless Tobacco Use: Never     Passive Exposure: Not on file       Follow Up      Return for Next scheduled follow up.      Patient was given instructions and counseling regarding her condition or for health maintenance advice. Please see specific information pulled into the AVS if appropriate.   I have reviewed information obtained and documented by others and I have confirmed the accuracy of this documented note.    ROBYN Sommers    Patient has been erroneously marked as diabetic. Based on the available clinical information, she does not have diabetes and should therefore be excluded from diabetic health maintenance and quality measures for the remainder of the reporting period.

## 2025-03-24 ENCOUNTER — OFFICE VISIT (OUTPATIENT)
Dept: FAMILY MEDICINE CLINIC | Facility: CLINIC | Age: 55
End: 2025-03-24
Payer: COMMERCIAL

## 2025-03-24 VITALS
HEIGHT: 65 IN | BODY MASS INDEX: 35.29 KG/M2 | SYSTOLIC BLOOD PRESSURE: 136 MMHG | DIASTOLIC BLOOD PRESSURE: 74 MMHG | WEIGHT: 211.8 LBS | TEMPERATURE: 98.5 F | HEART RATE: 71 BPM | OXYGEN SATURATION: 97 %

## 2025-03-24 DIAGNOSIS — E78.2 MIXED HYPERLIPIDEMIA: Primary | ICD-10-CM

## 2025-03-24 DIAGNOSIS — R73.09 ELEVATED HEMOGLOBIN A1C: ICD-10-CM

## 2025-03-24 PROBLEM — R07.2 SUBSTERNAL PAIN: Status: RESOLVED | Noted: 2021-12-07 | Resolved: 2025-03-24

## 2025-03-24 PROBLEM — N32.9 BLADDER DISORDER: Status: RESOLVED | Noted: 2021-11-16 | Resolved: 2025-03-24

## 2025-03-24 PROBLEM — Z12.4 PAP SMEAR FOR CERVICAL CANCER SCREENING: Status: RESOLVED | Noted: 2021-11-16 | Resolved: 2025-03-24

## 2025-03-24 PROCEDURE — 99213 OFFICE O/P EST LOW 20 MIN: CPT | Performed by: NURSE PRACTITIONER

## 2025-03-24 NOTE — PROGRESS NOTES
"Chief Complaint  Hyperlipidemia and Arthritis    SUBJECTIVE  Loreto Schumacher presents to [facility]    Hyperlipidemia:  Patient is taking Rosuvastatin.  Patient denies nocturnal leg cramps, myalgias.  Patient attempts to maintain a diet low in fat and carbohydrates.    Arthritis:  Patient is taking Hydroxychloroquine.    History of Present Illness  Past Medical History:   Diagnosis Date    Hyperlipidemia     Kidney stone       Family History   Problem Relation Age of Onset    Hyperlipidemia Mother     Heart disease Mother     Depression Mother     Hypertension Mother     Sleep apnea Mother     Diabetes Mother     Cancer Father     Diabetes Father     Alcohol abuse Father     COPD Father     Depression Father     Hearing loss Father     Sleep apnea Father     Sleep apnea Maternal Grandmother     Sleep apnea Maternal Grandfather     Sleep apnea Paternal Grandmother     Sleep apnea Paternal Grandfather     Malig Hyperthermia Neg Hx       Past Surgical History:   Procedure Laterality Date    APPENDECTOMY      COLONOSCOPY  2019    CYST REMOVAL      THIGH, ELBOW, HEAD    ENDOSCOPY N/A 12/28/2021    Procedure: ESOPHAGOGASTRODUODENOSCOPY;  Surgeon: Lázaro Ortega MD;  Location: [facility] ENDOSCOPY;  Service: General;  Laterality: N/A;  abdomina pain    HYSTERECTOMY      KIDNEY STONE SURGERY      SUBTOTAL HYSTERECTOMY  2004        Current Outpatient Medications:     hydroxychloroquine (PLAQUENIL) 200 MG tablet, Every 7 (Seven) Days., Disp: , Rfl:     rosuvastatin (Crestor) 10 MG tablet, Take 1 tablet by mouth Daily., Disp: 90 tablet, Rfl: 1    OBJECTIVE  Vital Signs:   /74 (BP Location: Left arm, Patient Position: Sitting, Cuff Size: Large Adult)   Pulse 71   Temp 98.5 °F (36.9 °C) (Temporal)   Ht 165.1 cm (65\")   Wt 96.1 kg (211 lb 12.8 oz)   SpO2 97%   BMI 35.25 kg/m²    Estimated body mass index is 35.25 kg/m² as calculated from the following:    Height as of this " "encounter: 165.1 cm (65\").    Weight as of this encounter: 96.1 kg (211 lb 12.8 oz).     Wt Readings from Last 3 Encounters:   03/24/25 96.1 kg (211 lb 12.8 oz)   03/19/25 96.3 kg (212 lb 3.2 oz)   02/15/25 94.3 kg (208 lb)     BP Readings from Last 3 Encounters:   03/24/25 136/74   03/19/25 139/67   02/15/25 138/85       Physical Exam  Vitals reviewed.   Constitutional:       Appearance: Normal appearance. She is well-developed.   HENT:      Head: Normocephalic and atraumatic.      Right Ear: External ear normal.      Left Ear: External ear normal.      Mouth/Throat:      Pharynx: No oropharyngeal exudate.   Eyes:      Conjunctiva/sclera: Conjunctivae normal.      Pupils: Pupils are equal, round, and reactive to light.   Neck:      Vascular: No carotid bruit.   Cardiovascular:      Rate and Rhythm: Normal rate and regular rhythm.      Pulses: Normal pulses.      Heart sounds: Normal heart sounds. No murmur heard.     No friction rub. No gallop.   Pulmonary:      Effort: Pulmonary effort is normal.      Breath sounds: Normal breath sounds. No wheezing or rhonchi.   Skin:     General: Skin is warm and dry.   Neurological:      Mental Status: She is alert and oriented to person, place, and time.      Cranial Nerves: No cranial nerve deficit.   Psychiatric:         Mood and Affect: Mood and affect normal.         Behavior: Behavior normal.         Thought Content: Thought content normal.         Judgment: Judgment normal.          Result Review    Common labs          3/19/2025    10:22   Common Labs   Glucose 98    BUN 12    Creatinine 0.67    Sodium 139    Potassium 4.6    Chloride 104    Calcium 9.6    Albumin 4.7    Total Bilirubin 0.7    Alkaline Phosphatase 79    AST (SGOT) 32    ALT (SGPT) 32    WBC 8.00    Hemoglobin 16.1    Hematocrit 48.6    Platelets 225    Total Cholesterol 355    Triglycerides 401    HDL Cholesterol 41    LDL Cholesterol  227    Hemoglobin A1C 5.90      CMP          3/19/2025    10:22 "   CMP   Glucose 98    BUN 12    Creatinine 0.67    EGFR 104.7    Sodium 139    Potassium 4.6    Chloride 104    Calcium 9.6    Total Protein 7.4    Albumin 4.7    Globulin 2.7    Total Bilirubin 0.7    Alkaline Phosphatase 79    AST (SGOT) 32    ALT (SGPT) 32    Albumin/Globulin Ratio 1.7    BUN/Creatinine Ratio 17.9    Anion Gap 12.0      CBC w/diff          3/19/2025    10:22   CBC w/Diff   WBC 8.00    RBC 5.51    Hemoglobin 16.1    Hematocrit 48.6    MCV 88.2    MCH 29.2    MCHC 33.1    RDW 13.2    Platelets 225    Neutrophil Rel % 51.1    Immature Granulocyte Rel % 0.9    Lymphocyte Rel % 35.6    Monocyte Rel % 7.4    Eosinophil Rel % 4.0    Basophil Rel % 1.0      Lipid Panel          3/19/2025    10:22   Lipid Panel   Total Cholesterol 355    Triglycerides 401    HDL Cholesterol 41    VLDL Cholesterol 87    LDL Cholesterol  227    LDL/HDL Ratio 5.70      TSH          3/19/2025    10:22   TSH   TSH 1.710      Most Recent A1C          3/19/2025    10:22   HGBA1C Most Recent   Hemoglobin A1C 5.90        Mammo Screening Digital Tomosynthesis Bilateral With CAD  Result Date: 3/17/2025  1.  No mammographic evidence of malignancy.  Recommend annual screening mammography. 2.  Breast arterial calcifications which have been associated with cardiovascular disease.  BI-RADS ASSESSMENT: Category 2: Benign  Note: It has been reported that there is approximately a 15% false negative rate in mammography.  Therefore, management of a palpable abnormality should not be deferred because of a negative mammogram.  3/17/2025 10:12 AM by Varun Jarquin on Workstation: [id number]      XR Chest 2 View  Result Date: 2/15/2025  No active disease. Electronically Signed: Hernandez Hogue MD  2/15/2025 10:47 AM EST  Workstation ID: [id number]        The above data has been reviewed by ROBYN Sommers 03/24/2025 10:05 EDT.          Patient Care Team:  Consuelo Arteaga APRN as PCP - General (Family Medicine)  Lázaro Ortega MD as  Consulting Physician (General Surgery)            ASSESSMENT & PLAN    Diagnoses and all orders for this visit:    1. Mixed hyperlipidemia (Primary)  Comments:  Restart Crestor 10 mg nightly, side effects and administration addressed.  Dietary modifications discussed.    2. Elevated hemoglobin A1c  Comments:  Repeat in 6 months.         Tobacco Use: Low Risk  (3/24/2025)    Patient History     Smoking Tobacco Use: Never     Smokeless Tobacco Use: Never     Passive Exposure: Not on file       Follow Up     Return in about 6 months (around 9/24/2025).      Patient was given instructions and counseling regarding her condition or for health maintenance advice. Please see specific information pulled into the AVS if appropriate.   I have reviewed information obtained and documented by others and I have confirmed the accuracy of this documented note.    Consuelo Arteaga, APRN

## 2025-03-31 ENCOUNTER — HOSPITAL ENCOUNTER (OUTPATIENT)
Dept: CT IMAGING | Facility: HOSPITAL | Age: 55
Discharge: HOME | End: 2025-03-31
Admitting: NURSE PRACTITIONER

## 2025-03-31 DIAGNOSIS — Z82.49 FAMILY HISTORY OF EARLY CAD: ICD-10-CM

## 2025-03-31 PROCEDURE — 75571 CT HRT W/O DYE W/CA TEST: CPT

## 2025-04-07 ENCOUNTER — TELEPHONE (OUTPATIENT)
Dept: FAMILY MEDICINE CLINIC | Facility: CLINIC | Age: 55
End: 2025-04-07
Payer: COMMERCIAL

## 2025-04-07 NOTE — TELEPHONE ENCOUNTER
[facility] called to inform PCP that insurance has denied Adult Stress Echo. [facility] provided peer to peer information:     Phone: [phone]  Tracking Number: [id number]    Peer to peer would need to be done within 10 business days. Today is Day 1.

## 2025-04-16 ENCOUNTER — PREP FOR SURGERY (OUTPATIENT)
Dept: OTHER | Facility: HOSPITAL | Age: 55
End: 2025-04-16
Payer: COMMERCIAL

## 2025-04-16 ENCOUNTER — OFFICE VISIT (OUTPATIENT)
Dept: SURGERY | Facility: CLINIC | Age: 55
End: 2025-04-16
Payer: COMMERCIAL

## 2025-04-16 VITALS
SYSTOLIC BLOOD PRESSURE: 134 MMHG | BODY MASS INDEX: 35.37 KG/M2 | OXYGEN SATURATION: 98 % | HEIGHT: 65 IN | WEIGHT: 212.3 LBS | HEART RATE: 72 BPM | DIASTOLIC BLOOD PRESSURE: 80 MMHG

## 2025-04-16 DIAGNOSIS — Z80.0 FAMILY HISTORY OF COLON CANCER IN FATHER: ICD-10-CM

## 2025-04-16 DIAGNOSIS — Z12.11 SCREENING FOR MALIGNANT NEOPLASM OF COLON: Primary | ICD-10-CM

## 2025-04-16 RX ORDER — SODIUM, POTASSIUM,MAG SULFATES 17.5-3.13G
SOLUTION, RECONSTITUTED, ORAL ORAL
Qty: 354 ML | Refills: 0 | Status: SHIPPED | OUTPATIENT
Start: 2025-04-16

## 2025-04-16 RX ORDER — SODIUM CHLORIDE 0.9 % (FLUSH) 0.9 %
10 SYRINGE (ML) INJECTION AS NEEDED
OUTPATIENT
Start: 2025-04-16

## 2025-04-16 RX ORDER — SODIUM CHLORIDE 0.9 % (FLUSH) 0.9 %
3 SYRINGE (ML) INJECTION EVERY 12 HOURS SCHEDULED
OUTPATIENT
Start: 2025-04-16

## 2025-04-16 RX ORDER — SODIUM CHLORIDE 9 MG/ML
40 INJECTION, SOLUTION INTRAVENOUS AS NEEDED
OUTPATIENT
Start: 2025-04-16

## 2025-04-16 NOTE — PROGRESS NOTES
Chief Complaint: new patient  (5 year colon consult )    Subjective      Colonoscopy consultation       History of Present Illness  Loreto Schumacher is a 54 y.o. female presents to [facility] for \colonoscopy consultation.    Patient presents today without complaints for screening colonoscopy.  She denies any abdominal pain, change in bowel habit, or rectal bleeding.  Admits to family history of colon cancer with her father.    Admits to LOYD.  Does use a CPAP.    Denies any cardiac issues.  Denies taking any GLP-1 receptors.    2/19: Colonoscopy (sly): diverticulosis; internal hemorrhoids.       Objective     Past Medical History:   Diagnosis Date    Hyperlipidemia     Kidney stone     Sleep apnea        Past Surgical History:   Procedure Laterality Date    APPENDECTOMY      COLONOSCOPY  2019    CYST REMOVAL      THIGH, ELBOW, HEAD    ENDOSCOPY N/A 12/28/2021    Procedure: ESOPHAGOGASTRODUODENOSCOPY;  Surgeon: Lázaro Ortega MD;  Location: [facility];  Service: General;  Laterality: N/A;  abdomina pain    HYSTERECTOMY      KIDNEY STONE SURGERY      SUBTOTAL HYSTERECTOMY  2004       Outpatient Medications Marked as Taking for the 4/16/25 encounter (Office Visit) with Steven April, APRN   Medication Sig Dispense Refill    hydroxychloroquine (PLAQUENIL) 200 MG tablet Every 7 (Seven) Days.      rosuvastatin (Crestor) 10 MG tablet Take 1 tablet by mouth Daily. 90 tablet 1       No Known Allergies     Family History   Problem Relation Age of Onset    Hyperlipidemia Mother     Heart disease Mother     Depression Mother     Hypertension Mother     Sleep apnea Mother     Diabetes Mother     Cancer Father     Diabetes Father     Alcohol abuse Father     COPD Father     Depression Father     Hearing loss Father     Sleep apnea Father     Heart attack Father     Heart disease Father     Hypertension Father     Arthritis Father     Sleep apnea Maternal Grandmother     Sleep apnea  "Maternal Grandfather     Heart attack Maternal Grandfather     Sleep apnea Paternal Grandmother     Sleep apnea Paternal Grandfather     Malig Hyperthermia Neg Hx        Social History     Socioeconomic History    Marital status:    Tobacco Use    Smoking status: Never    Smokeless tobacco: Never    Tobacco comments:     I do once in a great while smoke, but do not inhale   Vaping Use    Vaping status: Never Used   Substance and Sexual Activity    Alcohol use: Not Currently     Comment: May drink 1 pina colada a year    Drug use: Never    Sexual activity: Yes     Partners: Male     Birth control/protection: Hysterectomy     Comment: Partial hysterectomy       Review of Systems   Constitutional:  Negative for chills and fever.   Gastrointestinal:  Negative for abdominal distention, abdominal pain, anal bleeding, blood in stool, constipation, diarrhea and rectal pain.        Vital Signs:   /80 (BP Location: Left arm, Patient Position: Sitting, Cuff Size: Large Adult)   Pulse 72   Ht 165.1 cm (65\")   Wt 96.3 kg (212 lb 4.9 oz)   SpO2 98%   BMI 35.33 kg/m²      Physical Exam  Vitals and nursing note reviewed.   Constitutional:       General: She is not in acute distress.     Appearance: She is obese. She is not ill-appearing.   HENT:      Head: Normocephalic and atraumatic.   Cardiovascular:      Rate and Rhythm: Normal rate.   Pulmonary:      Effort: Pulmonary effort is normal.      Breath sounds: No stridor.   Abdominal:      Palpations: Abdomen is soft.      Tenderness: There is no guarding.   Musculoskeletal:         General: No deformity. Normal range of motion.   Skin:     General: Skin is warm and dry.      Coloration: Skin is not jaundiced.   Neurological:      General: No focal deficit present.      Mental Status: She is alert and oriented to person, place, and time.   Psychiatric:         Mood and Affect: Mood normal.         Thought Content: Thought content normal.          Result Review : "          []  Laboratory  []  Radiology  []  Pathology  []  Microbiology  []  EKG/Telemetry   []  Cardiology/Vascular   []  Old records  I spent 15 minutes caring for Loreto on this date of service. This time includes time spent by me in the following activities: reviewing tests, obtaining and/or reviewing a separately obtained history, performing a medically appropriate examination and/or evaluation, ordering medications, tests, or procedures, and documenting information in the medical record        Assessment and Plan    Diagnoses and all orders for this visit:    1. Screening for malignant neoplasm of colon (Primary)    2. Family history of colon cancer in father    Other orders  -     sodium-potassium-magnesium sulfates (Suprep Bowel Prep Kit) 17.5-3.13-1.6 GM/177ML solution oral solution; Take as directed.  Instructions given in office.  Dispense: 2 bottles  Dispense: 354 mL; Refill: 0        Follow Up   Return for Schedule colonoscopy with Dr. Ortega on 6/26/2025 at [facility].    Hospital arrival time: 0730.    Possible risks/complications, benefits, and alternatives to surgical or invasive procedures have been explained to patient and/or legal guardian.    Patient has been evaluated and can tolerate anesthesia and/or sedation. Risks, benefits, and alternatives to anesthesia and sedation have been explained to the patient and/or legal guardian. Patient verbalizes understanding and is willing to proceed with the above plan.     Patient was given instructions and counseling regarding her condition or for health maintenance advice. Please see specific information pulled into the AVS if appropriate.     As always, it has been a pleasure to participate in your patient's care. Please call with questions or concerns.

## 2025-05-06 ENCOUNTER — OFFICE VISIT (OUTPATIENT)
Dept: CARDIOLOGY | Facility: CLINIC | Age: 55
End: 2025-05-06
Payer: COMMERCIAL

## 2025-05-06 ENCOUNTER — TELEPHONE (OUTPATIENT)
Dept: CARDIOLOGY | Facility: CLINIC | Age: 55
End: 2025-05-06

## 2025-05-06 VITALS
SYSTOLIC BLOOD PRESSURE: 135 MMHG | HEIGHT: 65 IN | DIASTOLIC BLOOD PRESSURE: 77 MMHG | WEIGHT: 212 LBS | HEART RATE: 67 BPM | BODY MASS INDEX: 35.32 KG/M2 | OXYGEN SATURATION: 99 %

## 2025-05-06 DIAGNOSIS — E66.9 OBESITY (BMI 30-39.9): ICD-10-CM

## 2025-05-06 DIAGNOSIS — E78.2 MIXED HYPERLIPIDEMIA: Primary | ICD-10-CM

## 2025-05-06 DIAGNOSIS — E78.2 MIXED HYPERLIPIDEMIA: ICD-10-CM

## 2025-05-06 DIAGNOSIS — R93.1 ELEVATED CORONARY ARTERY CALCIUM SCORE: ICD-10-CM

## 2025-05-06 PROCEDURE — 99204 OFFICE O/P NEW MOD 45 MIN: CPT | Performed by: INTERNAL MEDICINE

## 2025-05-06 RX ORDER — ROSUVASTATIN CALCIUM 20 MG/1
20 TABLET, COATED ORAL DAILY
Qty: 90 TABLET | Refills: 2 | Status: SHIPPED | OUTPATIENT
Start: 2025-05-06

## 2025-05-06 RX ORDER — ASPIRIN 81 MG/1
81 TABLET ORAL DAILY
Qty: 90 TABLET | Refills: 2 | Status: SHIPPED | OUTPATIENT
Start: 2025-05-06

## 2025-05-06 RX ORDER — ROSUVASTATIN CALCIUM 10 MG/1
20 TABLET, COATED ORAL DAILY
Qty: 90 TABLET | Refills: 1 | Status: SHIPPED | OUTPATIENT
Start: 2025-05-06 | End: 2025-05-06

## 2025-05-06 RX ORDER — ROSUVASTATIN CALCIUM 20 MG/1
20 TABLET, COATED ORAL DAILY
Qty: 90 TABLET | Refills: 3 | Status: CANCELLED | OUTPATIENT
Start: 2025-05-06

## 2025-05-06 NOTE — TELEPHONE ENCOUNTER
INSURANCE WILL NOT COVER 2 - 10MG TABS DAILY.  IT WILL PAY FOR  1 - 20MG TAB DAILY    [facility]

## 2025-05-06 NOTE — PROGRESS NOTES
[facility]  INTERVENTIONAL CARDIOLOGY NEW PATIENT OFFICE VISIT      Chief Complaint  Hyperlipidemia and Establish Care    Subjective          History of Present Illness    Loreto Schumacher is a 54 y.o. female who presents to [facility] for new patient visit.       History of Present Illness  The patient presents for evaluation of elevated coronary calcium score and hypercholesterolemia.    Patient had LDL level of 2027 and had underwent coronary calcium score of 108, predominantly in the LAD.  Patient denies chest pain, shortness of breath, dizziness or other cardiac symptoms.    She reports a family history of cardiac disease, with her mother and maternal grandfather having undergone quadruple bypass surgeries. High cholesterol is noted to run on her mother's side of the family. She denies any history of smoking, though she mentions a single unsuccessful attempt in the past. She also denies alcohol consumption.    Currently, she is on a regimen of hydroxychloroquine, taken once weekly. 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 She had a brief period of discontinuation but has since resumed its use. She reports no adverse effects from the medication and has not contracted COVID-19 despite exposure to infected individuals. She expresses a desire to continue the medication.    She has recently initiated Crestor 10 mg daily therapy, which she reports has been effective in managing her cholesterol levels when taken consistently. She experienced transient leg cramps as a side effect of the medication, but these have since resolved. Her primary care physician has advised her to undergo repeat blood work in 6 months to assess the need for dosage adjustment.    SOCIAL HISTORY  Occupations:   Alcohol: Does not drink alcohol  Tobacco: Does not smoke, except for rare  instances of non-inhaled use when extremely stressed    FAMILY HISTORY  - Mother: Quadruple bypass surgery, high cholesterol  - Maternal grandfather: Triple or quadruple bypass surgery, high cholesterol        Past History:  Past Medical History:   Diagnosis Date    Hyperlipidemia     Kidney stone     Sleep apnea        Medical History:  Past Medical History:   Diagnosis Date    Hyperlipidemia     Kidney stone     Sleep apnea        Surgical History:   has a past surgical history that includes Hysterectomy; Cyst Removal; Kidney stone surgery; Appendectomy; Esophagogastroduodenoscopy (N/A, 12/28/2021); Colonoscopy (2019); and Subtotal Hysterectomy (2004).     Family History:   family history includes Alcohol abuse in her father; Arthritis in her father; COPD in her father; Cancer in her father; Depression in her father and mother; Diabetes in her father and mother; Hearing loss in her father; Heart attack in her father and maternal grandfather; Heart disease in her father and mother; Hyperlipidemia in her mother; Hypertension in her father and mother; Sleep apnea in her father, maternal grandfather, maternal grandmother, mother, paternal grandfather, and paternal grandmother.     Social History:   reports that she has never smoked. She has never been exposed to tobacco smoke. She has never used smokeless tobacco. She reports that she does not currently use alcohol. She reports that she does not use drugs.    Allergies:   Patient has no known allergies.    Current Outpatient Medications on File Prior to Visit   Medication Sig    hydroxychloroquine (PLAQUENIL) 200 MG tablet Every 7 (Seven) Days.    sodium-potassium-magnesium sulfates (Suprep Bowel Prep Kit) 17.5-3.13-1.6 GM/177ML solution oral solution Take as directed.  Instructions given in office.  Dispense: 2 bottles    [DISCONTINUED] rosuvastatin (Crestor) 10 MG tablet Take 1 tablet by mouth Daily.     No current facility-administered medications on file prior to  "visit.          Review of Systems   Negative ROS except as mentioned in HPI above.     Objective     /77 (BP Location: Left arm, Patient Position: Sitting, Cuff Size: Large Adult)   Pulse 67   Ht 165.1 cm (65\")   Wt 96.2 kg (212 lb)   SpO2 99%   BMI 35.28 kg/m²       Physical Exam  General : Alert, awake, no acute distress  Neck : Supple, no carotid bruit, no jugular venous distention  CVS : Regular rate and rhythm, no murmur, rubs or gallops  Lungs: Clear to auscultation bilaterally, no crackles or rhonchi  Abdomen: Soft, nontender, bowel sounds heard in all 4 quadrants  Extremities: Warm, well-perfused, no pedal edema      Result Review :     The following data was reviewed by: Vannesa Redd MD on 05/06/2025:    CMP          3/19/2025    10:22   CMP   Glucose 98    BUN 12    Creatinine 0.67    EGFR 104.7    Sodium 139    Potassium 4.6    Chloride 104    Calcium 9.6    Total Protein 7.4    Albumin 4.7    Globulin 2.7    Total Bilirubin 0.7    Alkaline Phosphatase 79    AST (SGOT) 32    ALT (SGPT) 32    Albumin/Globulin Ratio 1.7    BUN/Creatinine Ratio 17.9    Anion Gap 12.0      CBC          3/19/2025    10:22   CBC   WBC 8.00    RBC 5.51    Hemoglobin 16.1    Hematocrit 48.6    MCV 88.2    MCH 29.2    MCHC 33.1    RDW 13.2    Platelets 225      TSH          3/19/2025    10:22   TSH   TSH 1.710      Lipid Panel          3/19/2025    10:22   Lipid Panel   Total Cholesterol 355    Triglycerides 401    HDL Cholesterol 41    VLDL Cholesterol 87    LDL Cholesterol  227    LDL/HDL Ratio 5.70       A1C Last 3 Results          3/19/2025    10:22   HGBA1C Last 3 Results   Hemoglobin A1C 5.90        Data reviewed: Cardiology studies        No results found for this or any previous visit.          Procedures  Recent EKG reviewed, sinus rhythm without acute ST-T changes.    The ASCVD Risk score (Uriel SAMUELS, et al., 2019) failed to calculate for the following reasons:    The valid total cholesterol range is 130 to " 320 mg/dL         Assessment and Plan      Diagnoses and all orders for this visit:    1. Mixed hyperlipidemia (Primary)  -     rosuvastatin (Crestor) 10 MG tablet; Take 2 tablets by mouth Daily.  Dispense: 90 tablet; Refill: 1    2. Elevated coronary artery calcium score    3. Obesity (BMI 30-39.9)    Other orders  -     aspirin 81 MG EC tablet; Take 1 tablet by mouth Daily.  Dispense: 90 tablet; Refill: 2        Assessment & Plan  1. Elevated coronary calcium score.  She has an elevated coronary calcium score of 108 in the left anterior descending artery. Given her high cholesterol levels and family history of heart disease, she is at increased risk for cardiovascular events. She will start taking aspirin 81 mg daily to prevent heart attacks, strokes, and peripheral artery disease. She is advised to report any chest pain immediately.    2. Hypercholesterolemia.  Her LDL cholesterol level was 227 in 03/2025, which is significantly higher than the desired level of <100. She has been taking Crestor 10 mg since 03/2025 but will increase the dosage to 20 mg due to persistent high levels.  Patient was discussed about PCSK9 inhibitor such as Repatha to better control the cholesterol level.  The potential side effects of Repatha, including flu-like symptoms, were discussed. She will consider this option and discuss it further at the next visit. A repeat cholesterol level test is scheduled for 6 months from now, and she is instructed to fax the results to our office. She is also advised to adopt a healthier diet and lose weight.    Follow-up  The patient will follow up in 6 months.      Patient was educated on cardiac diet, adequate exercise and achieving/maintaining optimal weight.    Loreto Schumacher  reports that she has never smoked. She has never been exposed to tobacco smoke. She has never used smokeless tobacco.          Follow Up     Return in about 6 months (around 11/6/2025) for Next scheduled follow up, With  Julissa Wooten.        I spent 45 minutes caring for this patient on this date of service. This time includes time spent by me in the following activities:preparing for the visit, reviewing tests, obtaining and/or reviewing a separately obtained history, performing a medically appropriate examination and/or evaluation , counseling and educating the patient/family/caregiver, ordering medications, tests, or procedures, referring and communicating with other health care professionals , documenting information in the medical record, independently interpreting results and communicating that information with the patient/family/caregiver, and care coordination.     The patient was seen and examined. Work by the provider also included review and/or ordering of lab tests, review and/or ordering of radiology tests, review and/or ordering of medicine tests, discussion with other physicians or providers, independent review of data, obtaining old records, review/summation of old records, and/or other review.    I have reviewed the family history, social history, and past medical history for this patient. Previous information and data has been reviewed and updated as needed. I have reviewed and verified the chief complaint, history, and other documentation. The patient was interviewed and examined in the clinic and the chart reviewed. The previous observations, recommendations, and conclusions were reviewed including those of other providers.     The plan was discussed with the patient and/or family. The patient was given time to ask questions and these questions were answered. At the conclusion of their visit they had no additional questions or concerns and all questions were answered to their satisfaction.     Patient was given instructions and counseling regarding her condition or for health maintenance advice. Please see specific information pulled into the AVS if appropriate.      Patient or patient representative  verbalized consent for the use of Ambient Listening during the visit with  Vannesa Redd MD for chart documentation. 5/6/2025  09:21 EDT      Vannesa Redd MD, [facility]  05/06/25  09:04 EDT    Dictated Utilizing Dragon Dictation

## 2025-06-11 ENCOUNTER — APPOINTMENT (OUTPATIENT)
Dept: GENERAL RADIOLOGY | Facility: HOSPITAL | Age: 55
End: 2025-06-11
Payer: COMMERCIAL

## 2025-06-11 PROCEDURE — 99283 EMERGENCY DEPT VISIT LOW MDM: CPT

## 2025-06-11 PROCEDURE — 73600 X-RAY EXAM OF ANKLE: CPT

## 2025-06-12 ENCOUNTER — HOSPITAL ENCOUNTER (EMERGENCY)
Facility: HOSPITAL | Age: 55
Discharge: HOME | End: 2025-06-12
Attending: EMERGENCY MEDICINE
Payer: COMMERCIAL

## 2025-06-12 ENCOUNTER — TELEPHONE (OUTPATIENT)
Dept: ORTHOPEDIC SURGERY | Facility: CLINIC | Age: 55
End: 2025-06-12
Payer: COMMERCIAL

## 2025-06-12 VITALS
TEMPERATURE: 98.4 F | HEART RATE: 63 BPM | DIASTOLIC BLOOD PRESSURE: 87 MMHG | OXYGEN SATURATION: 98 % | HEIGHT: 64 IN | WEIGHT: 200 LBS | RESPIRATION RATE: 18 BRPM | SYSTOLIC BLOOD PRESSURE: 148 MMHG | BODY MASS INDEX: 34.15 KG/M2

## 2025-06-12 DIAGNOSIS — S82.831A CLOSED FRACTURE OF DISTAL END OF RIGHT FIBULA, UNSPECIFIED FRACTURE MORPHOLOGY, INITIAL ENCOUNTER: Primary | ICD-10-CM

## 2025-06-12 NOTE — ED PROVIDER NOTES
"SHARED VISIT ATTESTATION:    This visit was performed by myself and an APC.  I personally approved the management plan/medical decision making and take responsibility for the patient management.      SHARED VISIT NOTE:    Patient is 54 y.o. year old female that presents to the ED for evaluation of ankle pain.     Physical Exam    ED Course:    /92 (BP Location: Right arm, Patient Position: Lying)   Pulse 58   Temp 98.4 °F (36.9 °C) (Oral)   Resp 18   Ht 162.6 cm (64\")   Wt 90.7 kg (200 lb)   SpO2 98%   BMI 34.33 kg/m²       The following orders were placed and all results were independently analyzed by me:  Orders Placed This Encounter   Procedures    [facility] 09. Standard CAM Boot (), 13. Crutches (); Right; Right sided injury    XR Ankle 2 View Right    Ambulatory Referral to Orthopedic Surgery       Medications Given in the Emergency Department:  Medications - No data to display     ED Course:    ED Course as of 06/12/25 0052   Wed Jun 11, 2025   2149 --- PROVIDER IN TRIAGE NOTE ---    The patient was evaluated by Chau truong in triage. Orders were placed and the patient is currently awaiting disposition.    [AJ]   Thu Jun 12, 2025   0036 XR Ankle 2 View Right  IMPRESSION:  Lateral soft tissue swelling with tiny fracture fragments adjacent to the tip of the distal fibula. [MV]   0040 On exam,   R ankle: +Tenderness and swelling to the right lateral ankle. DP and PT 2+. Able to wiggle toes. [MV]      ED Course User Index  [AJ] Chau Nicholas PA-C  [MV] Parminder Bearden PA       Labs:    Lab Results (last 24 hours)       ** No results found for the last 24 hours. **             Imaging:    XR Ankle 2 View Right  Result Date: 6/11/2025  XR ANKLE 2 VW RIGHT Date of Exam: 6/11/2025 10:00 PM EDT Indication: fall, swelling Comparison: None available. Findings: There is marked lateral soft tissue swelling. There are multiple small calcifications adjacent to the tip of the distal " fibula suggesting avulsion fractures. Alignment at the mortise is normal. No other evidence of fracture is seen. There is mild posterior and plantar calcaneal spurring. No bone erosion or destruction.     Lateral soft tissue swelling with tiny fracture fragments adjacent to the tip of the distal fibula. Electronically Signed: Jakob Aguirre MD  6/11/2025 10:04 PM EDT  Workstation ID: [id number]      MDM:    Procedures                         Yaniv Worthington MD  00:52 EDT  06/12/25         Yaniv Worthington MD  06/12/25 0449

## 2025-06-12 NOTE — ED PROVIDER NOTES
Time: 9:49 PM EDT  Date of encounter:  6/11/2025  Independent Historian/Clinical History and Information was obtained by:   Patient    History is limited by: N/A    Chief Complaint: Ankle pain      History of Present Illness:  Patient is a 54 y.o. year old female who presents to the emergency department for evaluation of right ankle pain.  States that she was walking down the aisle of her [employer] when she twisted her ankle.  She has not been able to put as much weight on it.  Denies any head trauma or LOC.      Patient Care Team  Primary Care Provider: Consuelo Arteaga APRN    Past Medical History:     No Known Allergies  Past Medical History:   Diagnosis Date    Hyperlipidemia     Kidney stone     Sleep apnea      Past Surgical History:   Procedure Laterality Date    APPENDECTOMY      COLONOSCOPY  2019    CYST REMOVAL      THIGH, ELBOW, HEAD    ENDOSCOPY N/A 12/28/2021    Procedure: ESOPHAGOGASTRODUODENOSCOPY;  Surgeon: Lázaro Ortega MD;  Location: [facility] ENDOSCOPY;  Service: General;  Laterality: N/A;  abdomina pain    HYSTERECTOMY      KIDNEY STONE SURGERY      SUBTOTAL HYSTERECTOMY  2004     Family History   Problem Relation Age of Onset    Hyperlipidemia Mother     Heart disease Mother     Depression Mother     Hypertension Mother     Sleep apnea Mother     Diabetes Mother     Cancer Father     Diabetes Father     Alcohol abuse Father     COPD Father     Depression Father     Hearing loss Father     Sleep apnea Father     Heart attack Father     Heart disease Father     Hypertension Father     Arthritis Father     Sleep apnea Maternal Grandmother     Sleep apnea Maternal Grandfather     Heart attack Maternal Grandfather     Sleep apnea Paternal Grandmother     Sleep apnea Paternal Grandfather     Malig Hyperthermia Neg Hx        Home Medications:  Prior to Admission medications    Medication Sig Start Date End Date Taking? Authorizing Provider   aspirin 81 MG EC tablet Take 1 tablet by mouth Daily.  "5/6/25   Vannesa Redd MD   hydroxychloroquine (PLAQUENIL) 200 MG tablet Every 7 (Seven) Days.    Emergency, Nurse Roberto, RN   rosuvastatin (Crestor) 20 MG tablet Take 1 tablet by mouth Daily. 5/6/25   Vannesa Redd MD   sodium-potassium-magnesium sulfates (Suprep Bowel Prep Kit) 17.5-3.13-1.6 GM/177ML solution oral solution Take as directed.  Instructions given in office.  Dispense: 2 bottles 4/16/25   Steven, April, APRN        Social History:   Social History     Tobacco Use    Smoking status: Never     Passive exposure: Never    Smokeless tobacco: Never    Tobacco comments:     I do once in a great while smoke, but do not inhale   Vaping Use    Vaping status: Never Used   Substance Use Topics    Alcohol use: Not Currently     Comment: May drink 1 pina colada a year    Drug use: Never         Review of Systems:  Review of Systems   Constitutional:  Negative for chills and fever.   HENT:  Negative for ear pain.    Eyes:  Negative for pain.   Respiratory:  Negative for cough and shortness of breath.    Cardiovascular:  Negative for chest pain.   Gastrointestinal:  Negative for abdominal pain, diarrhea, nausea and vomiting.   Genitourinary:  Negative for dysuria.   Musculoskeletal:  Positive for arthralgias and joint swelling.   Skin:  Negative for rash.   Neurological:  Negative for headaches.        Physical Exam:  /87   Pulse 63   Temp 98.4 °F (36.9 °C) (Oral)   Resp 18   Ht 162.6 cm (64\")   Wt 90.7 kg (200 lb)   SpO2 98%   BMI 34.33 kg/m²     Physical Exam  HENT:      Head: Normocephalic.      Mouth/Throat:      Mouth: Mucous membranes are moist.   Eyes:      Pupils: Pupils are equal, round, and reactive to light.   Pulmonary:      Effort: Pulmonary effort is normal.   Abdominal:      General: There is no distension.   Musculoskeletal:      Cervical back: Neck supple.      Comments: R ankle: +Tenderness and swelling to the right lateral ankle. DP and PT 2+. Neurovasc intact. Able to wiggle " toes.   Skin:     General: Skin is warm and dry.   Neurological:      General: No focal deficit present.      Mental Status: She is alert and oriented to person, place, and time.   Psychiatric:         Mood and Affect: Mood normal.         Behavior: Behavior normal.                    Medical Decision Making:      Comorbidities that affect care:    None    External Notes reviewed:    None      The following orders were placed and all results were independently analyzed by me:  Orders Placed This Encounter   Procedures    [facility] 09. Standard CAM Boot (), 13. Crutches (); Right; Right sided injury    XR Ankle 2 View Right    Ambulatory Referral to Orthopedic Surgery       Medications Given in the Emergency Department:  Medications - No data to display     ED Course:    ED Course as of 06/12/25 0146   Wed Jun 11, 2025   2149 --- PROVIDER IN TRIAGE NOTE ---    The patient was evaluated by Chau truong in triage. Orders were placed and the patient is currently awaiting disposition.    [AJ]   Thu Jun 12, 2025   0036 XR Ankle 2 View Right  IMPRESSION:  Lateral soft tissue swelling with tiny fracture fragments adjacent to the tip of the distal fibula. [MV]   0040 On exam,   R ankle: +Tenderness and swelling to the right lateral ankle. DP and PT 2+. Able to wiggle toes. [MV]      ED Course User Index  [AJ] Chau Nicholas PA-MINA  [MV] Parminder Bearden PA       Labs:    Lab Results (last 24 hours)       ** No results found for the last 24 hours. **             Imaging:    XR Ankle 2 View Right  Result Date: 6/11/2025  XR ANKLE 2 VW RIGHT Date of Exam: 6/11/2025 10:00 PM EDT Indication: fall, swelling Comparison: None available. Findings: There is marked lateral soft tissue swelling. There are multiple small calcifications adjacent to the tip of the distal fibula suggesting avulsion fractures. Alignment at the mortise is normal. No other evidence of fracture is seen. There is mild posterior and  plantar calcaneal spurring. No bone erosion or destruction.     Lateral soft tissue swelling with tiny fracture fragments adjacent to the tip of the distal fibula. Electronically Signed: Jakob Aguirre MD  6/11/2025 10:04 PM EDT  Workstation ID: [id number]        Differential Diagnosis and Discussion:    Orthopedic Injuries: Differential diagnosis includes but is not limited to fractures, soft tissue injuries, dislocations, contusions, ligamentous injuries, tendon injuries, nerve injuries, compartment syndrome, bursitis, and vascular injuries.    PROCEDURES:    X-ray were performed in the emergency department and all X-ray impressions were independently interpreted by me.    No orders to display       Procedures    MDM     Amount and/or Complexity of Data Reviewed  Tests in the radiology section of CPT®: reviewed and ordered    Risk of Complications, Morbidity, and/or Mortality  Presenting problems: moderate  Diagnostic procedures: low  Management options: low    Patient Progress  Patient progress: stable                       Patient Care Considerations:    NARCOTICS: I considered prescribing opiate pain medication as an outpatient, however pain is controlled with nonopioid medications.      Consultants/Shared Management Plan:    None    Social Determinants of Health:    Patient is independent, reliable, and has access to care.       Disposition and Care Coordination:    Discharged: The patient is suitable and stable for discharge with no need for consideration of admission.    I have explained the patient´s condition, diagnoses and treatment plan based on the information available to me at this time. I have answered questions and addressed any concerns. The patient has a good  understanding of the patient´s diagnosis, condition, and treatment plan as can be expected at this point. The vital signs have been stable. The patient´s condition is stable and appropriate for discharge from the emergency department.       The patient will pursue further outpatient evaluation with the primary care physician or other designated or consulting physician as outlined in the discharge instructions. They are agreeable to this plan of care and follow-up instructions have been explained in detail. The patient has received these instructions in written format and has expressed an understanding of the discharge instructions. The patient is aware that any significant change in condition or worsening of symptoms should prompt an immediate return to this or the closest emergency department or call to 911.  I have explained discharge medications and the need for follow up with the patient/caretakers. This was also printed in the discharge instructions. Patient was discharged with the following medications and follow up:      Medication List        New Prescriptions      Diclofenac Sodium 1 % gel gel  Commonly known as: VOLTAREN  Apply 4 g topically to the appropriate area as directed 4 (Four) Times a Day As Needed (pain) for up to 7 days.               Where to Get Your Medications        These medications were sent to [facility] - [address] - [phone]  - [phone]   [address], [place] [address]      Phone: [phone]   Diclofenac Sodium 1 % gel gel      No follow-up provider specified.     Final diagnoses:   Closed fracture of distal end of right fibula, unspecified fracture morphology, initial encounter        ED Disposition       ED Disposition   Discharge    Condition   Stable    Comment   --               This medical record created using voice recognition software.             Parminder Bearden PA  06/12/25 0146

## 2025-06-12 NOTE — DISCHARGE INSTRUCTIONS
X-ray of your right ankle shows that there is a tiny fracture at the distal fibula.  You have been placed on a cam boot.  Wear this when up and about.  You can take this off at night.  Nonweightbearing on your right foot until you see orthopedics.    I have sent referral to orthopedics.  They will reach out in 1-2 business days.  For pain, you can take Tylenol and ibuprofen 2-3 times a day as needed.  You are also being discharged home with Voltaren gel.    Continue with RICE therapy.    Follow up with your Primary Care Provider in 3-5 days especially if symptoms worsen.    Return to the Emergency Department if you develop any uncontrollable fever, intractable pain, nausea, vomiting.

## 2025-06-16 ENCOUNTER — OFFICE VISIT (OUTPATIENT)
Dept: ORTHOPEDIC SURGERY | Facility: CLINIC | Age: 55
End: 2025-06-16
Payer: COMMERCIAL

## 2025-06-16 VITALS
DIASTOLIC BLOOD PRESSURE: 85 MMHG | SYSTOLIC BLOOD PRESSURE: 156 MMHG | WEIGHT: 200 LBS | BODY MASS INDEX: 34.15 KG/M2 | OXYGEN SATURATION: 97 % | HEIGHT: 64 IN | HEART RATE: 61 BPM

## 2025-06-16 DIAGNOSIS — S82.891A CLOSED AVULSION FRACTURE OF RIGHT ANKLE, INITIAL ENCOUNTER: Primary | ICD-10-CM

## 2025-06-16 NOTE — PROGRESS NOTES
"Chief Complaint  Pain and Initial Evaluation of the Right Ankle    Subjective          Loreto Schumacher presents to [facility] for an evaluation  of her right ankle.     History of Present Illness    The patient presents here today for an evaluation  of her right ankle. She was at [employer] last week when he right ankle gave out from her and she twisted her right ankle. She went to the emergency room on 06/11/25 where she had x-rays and placed into a short walking boot. She is ambulating today with one crutch. She has had a prior fracture to her ankle in the past.     No Known Allergies     Social History     Socioeconomic History    Marital status:    Tobacco Use    Smoking status: Never     Passive exposure: Never    Smokeless tobacco: Never    Tobacco comments:     I do once in a great while smoke, but do not inhale   Vaping Use    Vaping status: Never Used   Substance and Sexual Activity    Alcohol use: Not Currently     Comment: May drink 1 pina colada a year    Drug use: Never    Sexual activity: Yes     Partners: Male     Birth control/protection: Hysterectomy     Comment: Partial hysterectomy        I reviewed the patient's chief complaint, history of present illness, review of systems, past medical history, surgical history, family history, social history, medications, and allergy list.     REVIEW OF SYSTEMS    Constitutional: Denies fevers, chills, weight loss  Cardiovascular: Denies chest pain, shortness of breath  Skin: Denies rashes, acute skin changes  Neurologic: Denies headache, loss of consciousness  MSK: right ankle pain       Objective   Vital Signs:   /85   Pulse 61   Ht 162.6 cm (64\")   Wt 90.7 kg (200 lb)   SpO2 97%   BMI 34.33 kg/m²     Body mass index is 34.33 kg/m².    Physical Exam    General: Alert. No acute distress.   Right lower extremity: lateral  swelling to the ankle, tender to the lateral  ankle, achilles intact, intact dorsiflexion " and plantar flexion , mild medial tenderness, no pain or tenderness to the syndesmosis, calf soft, distal neurovascularly intact, positive  pulses, positive EHL, FHL, GS, and TA. Sensation intact to all 5 nerves of the foot.     Procedures    Imaging Results (Most Recent)       None                     Assessment and Plan        XR Ankle 2 View Right  Result Date: 6/11/2025  Narrative: XR ANKLE 2 VW RIGHT Date of Exam: 6/11/2025 10:00 PM EDT Indication: fall, swelling Comparison: None available. Findings: There is marked lateral soft tissue swelling. There are multiple small calcifications adjacent to the tip of the distal fibula suggesting avulsion fractures. Alignment at the mortise is normal. No other evidence of fracture is seen. There is mild posterior and plantar calcaneal spurring. No bone erosion or destruction.     Impression: Lateral soft tissue swelling with tiny fracture fragments adjacent to the tip of the distal fibula. Electronically Signed: Jakob Aguirre MD  6/11/2025 10:04 PM EDT  Workstation ID: [id number]       Diagnoses and all orders for this visit:    1. Closed avulsion fracture of right ankle, initial encounter (Primary)        The patient presents here today for an evaluation  of her right ankle.     She will continue the short walking boot and can progress to weight bearing as tolerated in the boot. She can wean off the crutches when able.     She will continue over the counter medications, icing and elevation for swelling.     Anticipate transition to ankle bracing and formal physical therapy at follow-up.      Will obtain X-Rays of right ankle at next visit.     Call or return if worsening symptoms.    Scribed for Arvind De La O MD by Soledad Summers  06/16/2025   08:14 EDT         Follow Up     3 weeks     Patient was given instructions and counseling regarding her condition or for health maintenance advice. Please see specific information pulled into the AVS if appropriate.     I have  personally performed the services described in this document as scribed by the above individual and it is both accurate and complete. Arvind De La O MD 06/16/25 08:18 EDT

## 2025-06-17 NOTE — PAT
Reviewed the following with patient.    Arrival time of 0730.    Must have  over 18 for transportation home post procedure. Come to [facility], entrance [address].     Education provided on laxative administration; bowel prep to be taken in two doses. Reviewed diet instructions for day prior to procedure. Only plain, unflavored water after midnight until two hours prior to arrival time.     Do not take any morning medications on the day of the procedure. Instead bring all prescribed medication and inhalers to the hospital the morning of the procedure. May take any nebulizer treatments or inhalers the morning of the procedure.     Plan to be here 3-4 hours.   Do not bring any valuables to hospital with you. Call Endo department for any questions.     Pt verbalized understanding of instructions.

## 2025-06-25 ENCOUNTER — ANESTHESIA EVENT (OUTPATIENT)
Dept: GASTROENTEROLOGY | Facility: HOSPITAL | Age: 55
End: 2025-06-25
Payer: COMMERCIAL

## 2025-06-25 NOTE — ANESTHESIA PREPROCEDURE EVALUATION
Anesthesia Evaluation     Patient summary reviewed   history of anesthetic complications:  Prolonged sedation  NPO Solid Status: > 8 hours  NPO Liquid Status: > 2 hours           Airway   Mallampati: II  TM distance: >3 FB  Neck ROM: full  No difficulty expected  Dental - normal exam     Pulmonary - normal exam    breath sounds clear to auscultation  (+) ,sleep apnea on CPAP  Cardiovascular - normal exam    ECG reviewed  Rhythm: regular  Rate: normal    (+) CAD (Elevated coronary artery calcium score), hyperlipidemia      Neuro/Psych- negative ROS  GI/Hepatic/Renal/Endo    (+) obesity, morbid obesity, renal disease- stones    Musculoskeletal (-) negative ROS    Abdominal    Substance History - negative use     OB/GYN negative ob/gyn ROS         Other - negative ROS       ROS/Med Hx Other: Screening colonoscopy, family hx colon cancer    2/15/25 EKG  HEART RATE=73  bpm  RR Lzitromt=813  ms  NJ Dvnyckuh=431  ms  P Horizontal Axis=10  deg  P Front Axis=36  deg  QRSD Interval=76  ms  QT Wmsrtwkh=640  ms  TKcI=706  ms  QRS Axis=2  deg  T Wave Axis=10  deg  - OTHERWISE NORMAL ECG -  Sinus rhythm  Low voltage, precordial leads  When compared with ECG of 02-Mar-2017 11:20:22,  No significant change  Electronically Signed By: Ricky Bunn ([facility]) 2025-03-02 17:11:05  Date and Time of Study:2025-02-15 11:01:49                    Anesthesia Plan    ASA 2     general   total IV anesthesia  (Total IV anesthesia. Patient understands anesthesia not responsible for dental damage. Discussed risks with pt including aspiration, allergic reactions, apnea, advanced airway placement. Pt verbalized understanding. All questions answered. )  intravenous induction     Anesthetic plan, risks, benefits, and alternatives have been provided, discussed and informed consent has been obtained with: patient.    Plan discussed with CRNA.    CODE STATUS:

## 2025-06-26 ENCOUNTER — HOSPITAL ENCOUNTER (OUTPATIENT)
Facility: HOSPITAL | Age: 55
Setting detail: HOSPITAL OUTPATIENT SURGERY
Discharge: HOME | End: 2025-06-26
Attending: SURGERY | Admitting: SURGERY
Payer: COMMERCIAL

## 2025-06-26 ENCOUNTER — ANESTHESIA (OUTPATIENT)
Dept: GASTROENTEROLOGY | Facility: HOSPITAL | Age: 55
End: 2025-06-26
Payer: COMMERCIAL

## 2025-06-26 VITALS
DIASTOLIC BLOOD PRESSURE: 89 MMHG | OXYGEN SATURATION: 97 % | SYSTOLIC BLOOD PRESSURE: 140 MMHG | HEART RATE: 65 BPM | WEIGHT: 208.11 LBS | RESPIRATION RATE: 18 BRPM | BODY MASS INDEX: 35.53 KG/M2 | HEIGHT: 64 IN | TEMPERATURE: 97.6 F

## 2025-06-26 DIAGNOSIS — Z12.11 SCREENING FOR MALIGNANT NEOPLASM OF COLON: ICD-10-CM

## 2025-06-26 DIAGNOSIS — Z80.0 FAMILY HISTORY OF COLON CANCER IN FATHER: ICD-10-CM

## 2025-06-26 PROCEDURE — 25810000003 LACTATED RINGERS PER 1000 ML: Performed by: NURSE ANESTHETIST, CERTIFIED REGISTERED

## 2025-06-26 PROCEDURE — 25010000002 LIDOCAINE PF 2% 2 % SOLUTION: Performed by: NURSE ANESTHETIST, CERTIFIED REGISTERED

## 2025-06-26 PROCEDURE — 25010000002 PROPOFOL 10 MG/ML EMULSION: Performed by: NURSE ANESTHETIST, CERTIFIED REGISTERED

## 2025-06-26 RX ORDER — PROPOFOL 10 MG/ML
VIAL (ML) INTRAVENOUS AS NEEDED
Status: DISCONTINUED | OUTPATIENT
Start: 2025-06-26 | End: 2025-06-26 | Stop reason: SURG

## 2025-06-26 RX ORDER — LIDOCAINE HYDROCHLORIDE 20 MG/ML
INJECTION, SOLUTION EPIDURAL; INFILTRATION; INTRACAUDAL; PERINEURAL AS NEEDED
Status: DISCONTINUED | OUTPATIENT
Start: 2025-06-26 | End: 2025-06-26 | Stop reason: SURG

## 2025-06-26 RX ORDER — SODIUM CHLORIDE 9 MG/ML
40 INJECTION, SOLUTION INTRAVENOUS AS NEEDED
Status: DISCONTINUED | OUTPATIENT
Start: 2025-06-26 | End: 2025-06-26 | Stop reason: HOSPADM

## 2025-06-26 RX ORDER — SODIUM CHLORIDE, SODIUM LACTATE, POTASSIUM CHLORIDE, CALCIUM CHLORIDE 600; 310; 30; 20 MG/100ML; MG/100ML; MG/100ML; MG/100ML
30 INJECTION, SOLUTION INTRAVENOUS CONTINUOUS
Status: DISCONTINUED | OUTPATIENT
Start: 2025-06-26 | End: 2025-06-26 | Stop reason: HOSPADM

## 2025-06-26 RX ORDER — SODIUM CHLORIDE 0.9 % (FLUSH) 0.9 %
3 SYRINGE (ML) INJECTION EVERY 12 HOURS SCHEDULED
Status: DISCONTINUED | OUTPATIENT
Start: 2025-06-26 | End: 2025-06-26 | Stop reason: HOSPADM

## 2025-06-26 RX ORDER — SODIUM CHLORIDE 0.9 % (FLUSH) 0.9 %
10 SYRINGE (ML) INJECTION AS NEEDED
Status: DISCONTINUED | OUTPATIENT
Start: 2025-06-26 | End: 2025-06-26 | Stop reason: HOSPADM

## 2025-06-26 RX ADMIN — SODIUM CHLORIDE, POTASSIUM CHLORIDE, SODIUM LACTATE AND CALCIUM CHLORIDE 30 ML/HR: 600; 310; 30; 20 INJECTION, SOLUTION INTRAVENOUS at 08:18

## 2025-06-26 RX ADMIN — PROPOFOL 200 MCG/KG/MIN: 10 INJECTION, EMULSION INTRAVENOUS at 08:56

## 2025-06-26 RX ADMIN — LIDOCAINE HYDROCHLORIDE 60 MG: 20 INJECTION, SOLUTION EPIDURAL; INFILTRATION; INTRACAUDAL; PERINEURAL at 08:56

## 2025-06-26 RX ADMIN — PROPOFOL 100 MG: 10 INJECTION, EMULSION INTRAVENOUS at 08:56

## 2025-06-26 NOTE — H&P
General Surgery/Colorectal Surgery Note    Patient Name:  Loreto Schumacher  YOB: 1970  [id number]    Referring Provider: Lázaro Ortega, *      Patient Care Team:  Consuelo Arteaga APRN as PCP - General (Family Medicine)  Lázaro Ortega MD as Consulting Physician (General Surgery)    Subjective .     History of present illness:    HPI   for \colonoscopy consultation.     Patient presents today without complaints for screening colonoscopy.  She denies any abdominal pain, change in bowel habit, or rectal bleeding.  Admits to family history of colon cancer with her father.    History:  Past Medical History:   Diagnosis Date    Hyperlipidemia     Kidney stone     Sleep apnea        Past Surgical History:   Procedure Laterality Date    APPENDECTOMY      COLONOSCOPY  2019    CYST REMOVAL      THIGH, ELBOW, HEAD    ENDOSCOPY N/A 12/28/2021    Procedure: ESOPHAGOGASTRODUODENOSCOPY;  Surgeon: Lázaro Ortega MD;  Location: [facility] ENDOSCOPY;  Service: General;  Laterality: N/A;  abdomina pain    HYSTERECTOMY      KIDNEY STONE SURGERY      SUBTOTAL HYSTERECTOMY  2004       Family History   Problem Relation Age of Onset    Hyperlipidemia Mother     Heart disease Mother     Depression Mother     Hypertension Mother     Sleep apnea Mother     Diabetes Mother     Cancer Father     Diabetes Father     Alcohol abuse Father     COPD Father     Depression Father     Hearing loss Father     Sleep apnea Father     Heart attack Father     Heart disease Father     Hypertension Father     Arthritis Father     Sleep apnea Maternal Grandmother     Sleep apnea Maternal Grandfather     Heart attack Maternal Grandfather     Sleep apnea Paternal Grandmother     Sleep apnea Paternal Grandfather     Malig Hyperthermia Neg Hx        Social History     Tobacco Use    Smoking status: Never     Passive exposure: Never    Smokeless tobacco: Never    Tobacco comments:     I do once in a great while smoke, but do not  inhale   Vaping Use    Vaping status: Never Used   Substance Use Topics    Alcohol use: Not Currently     Comment: May drink 1 pina colada a year    Drug use: Never       Review of Systems: See HPI    Review of Systems            Medications Prior to Admission   Medication Sig Dispense Refill Last Dose/Taking    aspirin 81 MG EC tablet Take 1 tablet by mouth Daily. 90 tablet 2     hydroxychloroquine (PLAQUENIL) 200 MG tablet Every 7 (Seven) Days.       rosuvastatin (Crestor) 20 MG tablet Take 1 tablet by mouth Daily. 90 tablet 2     sodium-potassium-magnesium sulfates (Suprep Bowel Prep Kit) 17.5-3.13-1.6 GM/177ML solution oral solution Take as directed.  Instructions given in office.  Dispense: 2 bottles 354 mL 0          Home Meds:      Prior to Admission medications    Medication Sig Start Date End Date Taking? Authorizing Provider   aspirin 81 MG EC tablet Take 1 tablet by mouth Daily. 5/6/25   Vannesa Redd MD   hydroxychloroquine (PLAQUENIL) 200 MG tablet Every 7 (Seven) Days.    Emergency, Nurse Roberto, RN   rosuvastatin (Crestor) 20 MG tablet Take 1 tablet by mouth Daily. 5/6/25   Vannesa Redd MD   sodium-potassium-magnesium sulfates (Suprep Bowel Prep Kit) 17.5-3.13-1.6 GM/177ML solution oral solution Take as directed.  Instructions given in office.  Dispense: 2 bottles 4/16/25 Brock, April, APRN            Allergies:  Patient has no known allergies.      Objective     Vital Signs   Temp:  [97.2 °F (36.2 °C)] 97.2 °F (36.2 °C)  Heart Rate:  [72] 72  Resp:  [18] 18  BP: (156)/(86) 156/86    Physical Exam:     Physical Exam    NAD, A/O x 4, normal circulation, normal respiration      Result Review :Labs  Result Review  Imaging  Med Tab  Media    Assessment & Plan     Diagnoses and all orders for this visit:    1. Screening for malignant neoplasm of colon  -     sodium chloride 0.9 % flush 3 mL  -     sodium chloride 0.9 % flush 10 mL  -     sodium chloride 0.9 % infusion 40 mL    2. Family  history of colon cancer in father  -     sodium chloride 0.9 % flush 3 mL  -     sodium chloride 0.9 % flush 10 mL  -     sodium chloride 0.9 % infusion 40 mL    Other orders  -     Continuous Pulse Oximetry; Standing  -     POC Glucose Once; Standing  -     Insert Peripheral IV; Standing  -     lactated ringers infusion  -     Follow Anesthesia Guidelines / Protocol; Standing  -     Verify Bowel Prep Was Successful; Standing  -     Give Tap Water Enema If Bowel Prep Insufficient; Standing  -     Insert Peripheral IV; Standing  -     Saline Lock & Maintain IV Access; Standing  -     Place Sequential Compression Device; Standing  -     Maintain Sequential Compression Device; Standing  -     Verify NPO Status; Standing  -     Continuous Pulse Oximetry  -     POC Glucose Once  -     Insert Peripheral IV  -     Follow Anesthesia Guidelines / Protocol  -     Verify Bowel Prep Was Successful  -     Give Tap Water Enema If Bowel Prep Insufficient  -     Insert Peripheral IV  -     Saline Lock & Maintain IV Access  -     Place Sequential Compression Device  -     Maintain Sequential Compression Device  -     Verify NPO Status           Risks including bleeding, perforation, pain, infection, missed polyp(s). Benefits, and alternatives of Colonoscopy discussed with patient.  All questions answered.  Consent verified.         Lázaro Ortega MD  06/26/25 07:55 EDT

## 2025-06-26 NOTE — ANESTHESIA POSTPROCEDURE EVALUATION
Patient: Loreto Schumacher    Procedure Summary       Date: 06/26/25 Room / Location: [facility] ENDOSCOPY 1 / [facility] END[facility]COPY    Anesthesia Start: 0853 Anesthesia Stop: 0912    Procedure: COLONOSCOPY with possible biopsies:  look inside colon with camera and possibly remove tissue for testing Diagnosis:       Screening for malignant neoplasm of colon      Family history of colon cancer in father      (Screening for malignant neoplasm of colon [Z12.11])      (Family history of colon cancer in father [Z80.0])    Surgeons: Lázaro Ortega MD Provider: Gregory Ragland CRNA    Anesthesia Type: general ASA Status: 2            Anesthesia Type: general    Vitals  Vitals Value Taken Time   /89 06/26/25 09:25   Temp 36.4 °C (97.6 °F) 06/26/25 09:10   Pulse 66 06/26/25 09:27   Resp 18 06/26/25 09:25   SpO2 96 % 06/26/25 09:27   Vitals shown include unfiled device data.        Post Anesthesia Care and Evaluation    Post-procedure mental status: acceptable.  Pain management: satisfactory to patient    Airway patency: patent  Anesthetic complications: No anesthetic complications    Cardiovascular status: acceptable  Respiratory status: acceptable    Comments: Per chart review    
PAST SURGICAL HISTORY:  No significant past surgical history

## 2025-07-11 ENCOUNTER — OFFICE VISIT (OUTPATIENT)
Dept: ORTHOPEDIC SURGERY | Facility: CLINIC | Age: 55
End: 2025-07-11
Payer: COMMERCIAL

## 2025-07-11 VITALS — OXYGEN SATURATION: 96 % | BODY MASS INDEX: 35.51 KG/M2 | HEIGHT: 64 IN | HEART RATE: 68 BPM | WEIGHT: 208 LBS

## 2025-07-11 DIAGNOSIS — M25.571 RIGHT ANKLE PAIN, UNSPECIFIED CHRONICITY: Primary | ICD-10-CM

## 2025-07-11 DIAGNOSIS — S82.891D CLOSED AVULSION FRACTURE OF RIGHT ANKLE WITH ROUTINE HEALING, SUBSEQUENT ENCOUNTER: ICD-10-CM

## 2025-07-11 NOTE — PROGRESS NOTES
"Chief Complaint  Pain and Follow-up of the Right Ankle    Subjective          Loreto Schumacher presents to [facility] for a follow up for her right ankle.     History of Present Illness    The patient presents here today for a follow up for her right ankle. She has been treating her right ankle fracture conservatively. She presents today in a normal shoe but states her right ankle is still swollen and painful. She denies any new injury or falls since her last visit. She initially injured her right ankle on 06/11/25.     No Known Allergies     Social History     Socioeconomic History    Marital status:    Tobacco Use    Smoking status: Never     Passive exposure: Never    Smokeless tobacco: Never    Tobacco comments:     I do once in a great while smoke, but do not inhale   Vaping Use    Vaping status: Never Used   Substance and Sexual Activity    Alcohol use: Not Currently     Comment: May drink 1 pina colada a year    Drug use: Never    Sexual activity: Yes     Partners: Male     Birth control/protection: Hysterectomy     Comment: Partial hysterectomy        I reviewed the patient's chief complaint, history of present illness, review of systems, past medical history, surgical history, family history, social history, medications, and allergy list.     REVIEW OF SYSTEMS    Constitutional: Denies fevers, chills, weight loss  Cardiovascular: Denies chest pain, shortness of breath  Skin: Denies rashes, acute skin changes  Neurologic: Denies headache, loss of consciousness  MSK: right ankle pain       Objective   Vital Signs:   Pulse 68   Ht 162.6 cm (64\")   Wt 94.3 kg (208 lb)   SpO2 96%   BMI 35.70 kg/m²     Body mass index is 35.7 kg/m².    Physical Exam    General: Alert. No acute distress.   Right lower extremity: intact dorsiflexion and plantar flexion , tender to the lateral  ankle, mild swelling, no bruising, distal neurovascularly intact, positive  pulses, positive EHL, " FHL, GS, and TA. Sensation intact to all 5 nerves of the foot.      Procedures    Imaging Results (Most Recent)       Procedure Component Value Units Date/Time    XR Ankle 3+ View Right [[id number]] Resulted: 07/11/25 1540     Updated: 07/11/25 1540    Narrative:      Indications: Follow-up right ankle avulsion fracture    Views: AP, oblique, lateral right ankle    Findings: Small avulsion adjacent to the distal tip of the lateral   malleolus appears stable.  Ankle mortise well aligned.    Comparative Data: Comparative data found and reviewed today                     Assessment and Plan        XR Ankle 3+ View Right  Result Date: 7/11/2025  Narrative: Indications: Follow-up right ankle avulsion fracture Views: AP, oblique, lateral right ankle Findings: Small avulsion adjacent to the distal tip of the lateral malleolus appears stable.  Ankle mortise well aligned. Comparative Data: Comparative data found and reviewed today    XR Ankle 2 View Right  Result Date: 6/11/2025  Narrative: XR ANKLE 2 VW RIGHT Date of Exam: 6/11/2025 10:00 PM EDT Indication: fall, swelling Comparison: None available. Findings: There is marked lateral soft tissue swelling. There are multiple small calcifications adjacent to the tip of the distal fibula suggesting avulsion fractures. Alignment at the mortise is normal. No other evidence of fracture is seen. There is mild posterior and plantar calcaneal spurring. No bone erosion or destruction.     Impression: Lateral soft tissue swelling with tiny fracture fragments adjacent to the tip of the distal fibula. Electronically Signed: Jakob Aguirre MD  6/11/2025 10:04 PM EDT  Workstation ID: [id number]       Diagnoses and all orders for this visit:    1. Right ankle pain, unspecified chronicity (Primary)  -     XR Ankle 3+ View Right    2. Closed avulsion fracture of right ankle with routine healing, subsequent encounter      The patient presents here today for a follow up for her right ankle.  X-rays were obtained in the office today and these were reviewed today.     She will be placed into a lace up ankle brace and will continue home exercises. Advised to wear a compression sock to help with swelling.     She will continue over the counter medications for pain control.     Will obtain X-Rays of right ankle at next visit.     Call or return if worsening symptoms.    Scribed for Arvind De La O MD by Soledad Summers  07/11/2025   08:23 EDT         Follow Up     4 weeks     Patient was given instructions and counseling regarding her condition or for health maintenance advice. Please see specific information pulled into the AVS if appropriate.       I have personally performed the services described in this document as scribed by the above individual and it is both accurate and complete. Arvind De La O MD 07/11/25 15:52 EDT

## 2025-07-17 ENCOUNTER — TELEPHONE (OUTPATIENT)
Dept: SURGERY | Facility: CLINIC | Age: 55
End: 2025-07-17
Payer: COMMERCIAL

## 2025-07-17 NOTE — TELEPHONE ENCOUNTER
----- Message -----  From: Steven April, ROBYN  Sent: 7/8/2025   8:21 AM EDT  To: Phylicia Angel    5 year colon recall. Mb    Date of most recent Colonoscopy:06/26/2025  Date of next expected Colonoscopy:06/26/2030    Recall has been entered into the patient's chart and Care Gap has been updated.

## 2025-08-08 ENCOUNTER — OFFICE VISIT (OUTPATIENT)
Dept: ORTHOPEDIC SURGERY | Facility: CLINIC | Age: 55
End: 2025-08-08
Payer: COMMERCIAL

## 2025-08-08 DIAGNOSIS — M25.571 RIGHT ANKLE PAIN, UNSPECIFIED CHRONICITY: ICD-10-CM

## 2025-08-08 DIAGNOSIS — S82.891D CLOSED AVULSION FRACTURE OF RIGHT ANKLE WITH ROUTINE HEALING, SUBSEQUENT ENCOUNTER: Primary | ICD-10-CM

## (undated) DEVICE — TUBING, SUCTION, 1/4" X 10', STRAIGHT: Brand: MEDLINE

## (undated) DEVICE — CONN JET HYDRA H20 AUXILIARY DISP

## (undated) DEVICE — Device

## (undated) DEVICE — THE STERILE LIGHT HANDLE COVER IS USED WITH STERIS SURGICAL LIGHTING AND VISUALIZATION SYSTEMS.

## (undated) DEVICE — SINGLE-USE BIOPSY FORCEPS: Brand: RADIAL JAW 4

## (undated) DEVICE — STERILE POLYISOPRENE POWDER-FREE SURGICAL GLOVES WITH EMOLLIENT COATING: Brand: PROTEXIS

## (undated) DEVICE — Device: Brand: DEFENDO AIR/WATER/SUCTION AND BIOPSY VALVE

## (undated) DEVICE — STERILE POLYISOPRENE POWDER-FREE SURGICAL GLOVES: Brand: PROTEXIS

## (undated) DEVICE — EGD OR ERCP KIT: Brand: MEDLINE INDUSTRIES, INC.

## (undated) DEVICE — SOLIDIFIER LIQLOC PLS 1500CC BT

## (undated) DEVICE — DEFENDO AIR WATER SUCTION AND BIOPSY VALVE KIT FOR  OLYMPUS: Brand: DEFENDO AIR/WATER/SUCTION AND BIOPSY VALVE

## (undated) DEVICE — SOL IRRG H2O PL/BG 1000ML STRL

## (undated) DEVICE — SOL IRR H2O BO 1000ML STRL

## (undated) DEVICE — LINER SURG CANSTR SXN S/RIGD 1500CC